# Patient Record
Sex: FEMALE | Race: BLACK OR AFRICAN AMERICAN
[De-identification: names, ages, dates, MRNs, and addresses within clinical notes are randomized per-mention and may not be internally consistent; named-entity substitution may affect disease eponyms.]

---

## 2018-10-31 ENCOUNTER — HOSPITAL ENCOUNTER (INPATIENT)
Dept: HOSPITAL 74 - FM/S | Age: 53
LOS: 4 days | Discharge: HOME | DRG: 470 | End: 2018-11-04
Attending: ORTHOPAEDIC SURGERY | Admitting: ORTHOPAEDIC SURGERY
Payer: COMMERCIAL

## 2018-10-31 VITALS — BODY MASS INDEX: 36.9 KG/M2

## 2018-10-31 DIAGNOSIS — D64.9: ICD-10-CM

## 2018-10-31 DIAGNOSIS — I10: ICD-10-CM

## 2018-10-31 DIAGNOSIS — E66.9: ICD-10-CM

## 2018-10-31 DIAGNOSIS — M17.11: Primary | ICD-10-CM

## 2018-10-31 PROCEDURE — 0SRC0J9 REPLACEMENT OF RIGHT KNEE JOINT WITH SYNTHETIC SUBSTITUTE, CEMENTED, OPEN APPROACH: ICD-10-PCS | Performed by: ORTHOPAEDIC SURGERY

## 2018-10-31 RX ADMIN — OXYCODONE HYDROCHLORIDE SCH MG: 10 TABLET, FILM COATED, EXTENDED RELEASE ORAL at 21:58

## 2018-10-31 RX ADMIN — OXYCODONE HYDROCHLORIDE AND ACETAMINOPHEN SCH MG: 500 TABLET ORAL at 21:59

## 2018-10-31 RX ADMIN — DOCUSATE SODIUM,SENNOSIDES SCH TABLET: 50; 8.6 TABLET, FILM COATED ORAL at 21:59

## 2018-10-31 NOTE — OP
DATE OF OPERATION:

 

DATE OF DICTATION:  10/31/2018

 

SURGEON:  Javon Canales M.D. 

 

ASSISTANT:  Martin Canales M.D., and DRAGAN Rodgers 

 

PREOPERATIVE DIAGNOSIS:  Tricompartment osteoarthritis right knee.  

 

POSTOPERATIVE DIAGNOSIS:  Tricompartment osteoarthritis right knee.  

 

OPERATION PERFORMED:  Right posterior stabilized total knee arthroplasty ([place])

subvastus approach.  

 

ANESTHESIA:  Spinal with peripheral block and conscious sedation.  

 

ANTIBIOTICS GIVEN:  2 g Kefzol, 1 g vancomycin preoperative, 1 g Kefzol given at the

time of release of tourniquet.

 

TOURNIQUET TIME:  100 minutes.

 

DESCRIPTION OF PROCEDURE:  Patient was correctly identified, brought in operating

room.  Right lower extremity was prepped, pre-draped in the routine manner with

Betadine scrub solution, wiped with alcohol, DuraPrep applied.  The right knee was

exposed via midline incision.  Dissection was taken through the fascia to expose the

entire quadriceps mechanism starting at the distal end of the tibial tubercle,

coursing up along the medial parapatellar region alongside the medial aspect of the

border of the patella ligament.  The dissection was curved towards the posterior

medial aspect of the femoral condyle towards the adductor canal and adductor hiatus. 

At that point, the fascia and epimysium were dissected sharply off the actual vastus

medialis muscle.  A plane created between the muscle and the capsule enabled sitting

of a blunt Hohmann into this area, and the tissue deep to that which was the

suprapatellar pouch and synovium of the pouch was extended longitudinally upwards to

give easy access to subluxing the patella laterally over the lateral femoral condyle

to gain exposure of the entire knee.  Tricompartment osteoarthritis encountered.  The

jig cuts were made with the [email or web address] system.  The patella was cut in accord with

Bergen line from patella ligament to quadriceps tendon.  The femur was cut with

the jig system appropriately all cuts made to measure for size 5 and a 4 degrees

varus, 3 degrees external rotation and size 5 femoral component was measured and cut

appropriately.  The tibia was cut to neutral.  The flexion, extension gaps were

measured at 16 mm.  The tibia was finally prepared appropriately with the appropriate

drill and winged punch.  The entry point of the intramedullary ed for the femur was

plugged with a bone plug. The tibia was seated, aligned to the tibial tubercle to

insure that correct orientation of foot was enjoyed.  Once we were satisfied with the

trialing of all components, both the mechanical axis was restored to that of exactly

in the mechanical axis.  The bone was thoroughly lavaged.  Cementing was done in 1

stage, femur size 5, tibia size 5, polyethylene liner after extra removal _____ all

cement that had been extruded and the tissue was thoroughly lavaged with a size 16

polyethylene with a TS posterior stabilized peg.  The patella was a size 27 mm

patella button, this was held in position until cement had cured as well.  Patella

tracking was excellent for the _____.  Knee was brought into full extension, complete

stability in both flexion and extension, indicating equal flexion extension gaps

appropriately.  The wounds were thoroughly lavaged again.  Closure, the parapatellar

tissues with 1 Vicryl, subcutaneous 1 and 2-0 Vicryl, skin 3-0 Monocryl with

Steri-Strips.  Drains, one 18-inch Hemovac to the medial side of the distal femur

where the distal aspect of the dissection was performed to expose the vastus

medialis.  No complications.  Excellent positioning clinically.  Range of movement 0

to 120 degrees with no difficulty.    

 

 

MD AYANA Becerra/[id number]

DD: 10/31/2018 19:08

DT: 10/31/2018 20:35

Job #:  [id number]

## 2018-10-31 NOTE — PN
Progress Note (short form)





- Note


Progress Note: 





Surgery





S/P  R TKA POD #0





- Pain control.


-DVT PPx:


   -Chemical: ASA 81 mg po BID x 6 weeks


   -Mechanical: ZOHRA's, SCD's


-Incentive Spirometry.


-PT/OT/Rehab, OOB.


-WBAT  RLE.


-f/u drain output


-f/u am labs.


-Care per medical hospitalist team.


-Discharge planning: f/u [facility] [place] office on


  Call for appointment: [phone]


-Will follow.





Javon Canales MD (Orthopaedic Surgery)

## 2018-10-31 NOTE — CONSULT
Consult


Consult Specialty:: Hospital Medicine


Referred by:: Dr. Canales


Reason for Consultation:: Medical Management





- History of Present Illness


Chief Complaint: R- Knee pain


History of Present Illness: 





This is a 52 y/o woman with a PMHx of HTN, Anemia, OA. Who was admitted to M/S 

floor after having elective surgery on her right knee. s/p R-TKR POD #0. 

Patient reports having chronic right knee pain that was so debilitating that 

she could not walk, prompting her to have surgery. Patient is AAOx3, reports 

having throbbing pain to her right knee. PS 8/10. Patient reports parasthesia 

to her right leg. Patient has eaten, and tolerated her meal. Patient has not 

voided, not having flatulence. Patient denies fever, chills, cough, SOB, CP, 

palpitations, N/V/D. dysuria.











- History Source


History Provided By: Patient


Limitations to Obtaining History: No Limitations





- Past Medical History


Cardio/Vascular: Yes: HTN


...LMP: 18


...Pregnant: No


Heme/Onc: Yes: Anemia


Musculoskeletal: Yes: Osteoarthritis





- Past Surgical History


Past Surgical History: Yes: Hysterectomy


Additional Surgical History: Myomectomy.  Hysteroscopy.  Nasal Sinus.   

Section





- Alcohol/Substance Use


Hx Alcohol Use: No


History of Substance Use: reports: None





- Smoking History


Smoking history: Never smoked


Have you smoked in the past 12 months: No





- Social History


Usual Living Arrangement: With Spouse


ADL: Independent


Occupation: Unemployed worked at the [employer] in the past


History of Recent Travel: No





Home Medications





- Allergies


Allergies/Adverse Reactions: 


 Allergies











Allergy/AdvReac Type Severity Reaction Status Date / Time


 


No Known Allergies Allergy   Verified 10/31/18 12:06














- Home Medications


Home Medications: 


Ambulatory Orders





Hydrochlorothiazide [Hctz -] 25 mg PO DAILY 18 


Aspirin/Acetaminophen/Caffeine [Excedrin Extra Strength Caplet] 1 each PO ASDIR 

PRN 10/24/18 











Family Disease History





- Family Disease History


Family Disease History: Heart Disease: Father (CHF), Mother (HTN, Stomach), CA: 

Mother





Review of Systems





- Review of Systems


Constitutional: reports: No Symptoms


Eyes: reports: No Symptoms


HENT: reports: No Symptoms


Neck: reports: No Symptoms


Cardiovascular: reports: No Symptoms


Respiratory: reports: No Symptoms


Gastrointestinal: reports: No Symptoms


Genitourinary: reports: No Symptoms


Breasts: reports: No Symptoms Reported


Musculoskeletal: reports: Joint Pain


Integumentary: reports: No Symptoms


Neurological: reports: Parasthesia


Endocrine: reports: No Symptoms


Hematology/Lymphatic: reports: No Symptoms


Psychiatric: reports: No Symptoms


Pain Intensity: 8





Physical Exam


Vital Signs: 


 Vital Signs











Temperature  97.6 F   10/31/18 22:00


 


Pulse Rate  103 H  10/31/18 22:00


 


Respiratory Rate  20   10/31/18 22:00


 


Blood Pressure  150/88   10/31/18 22:00


 


O2 Sat by Pulse Oximetry (%)  100   10/31/18 20:05











Constitutional: Yes: Mild Distress, Obese


Eyes: Yes: WNL, Conjunctiva Clear, EOM Intact, PERRL


HENT: Yes: WNL, Atraumatic, Normocephalic


Neck: Yes: WNL, Supple, Trachea Midline


Cardiovascular: Yes: WNL, Regular Rate and Rhythm, S1, S2


Respiratory: Yes: WNL, Regular, CTA Bilaterally, On Nasal O2


Gastrointestinal: Yes: Soft, Abdomen, Obese, Hypoactive Bowel Sounds


...Rectal Exam: Yes: Deferred


Breast(s): Yes: WNL


Musculoskeletal: Yes: Other (Right knee pain, icepack)


Edema: No


Wound/Incision: Yes: Dressing Dry and Intact


Neurological: Yes: WNL, Alert, Oriented, Cran Nerves II-XII Intact


...Motor Strength: LUE, LLE, RUE


Psychiatric: Yes: WNL, Alert, Oriented


Labs: 





 Laboratory Results - last 24 hr











  10/31/18





  12:00


 


Urine HCG, Qual  Negative








 Current Medications











Generic Name Dose Route Start Last Admin





  Trade Name Freq  PRN Reason Stop Dose Admin


 


Acetaminophen  650 mg  10/31/18 17:00  18 04:16





  Tylenol -  PO  18 16:59  650 mg





  Q6H OPHELIA   Administration





     





     





     





     


 


Al Hydroxide/Mg Hydroxide  30 ml  10/31/18 18:54  





  Mylanta Oral Suspension -  PO   





  Q4H PRN   





  DYSPEPSIA   





     





     





     


 


Ascorbic Acid  500 mg  10/31/18 22:00  10/31/18 21:59





  Vitamin C -  PO   500 mg





  BID OPHELIA   Administration





     





     





     





     


 


Aspirin  81 mg  18 10:00  





  Ecotrin -  PO   





  BID OPHELIA   





     





     





     





     


 


Ferrous Sulfate  325 mg  18 08:00  





  Feosol -  PO   





  BIDWM OPHELIA   





     





     





     





     


 


Hydrochlorothiazide  25 mg  18 10:00  





  Hctz -  PO   





  DAILY [facility]   





     





     





     





     


 


Hydromorphone HCl  1 mg  18 00:40  18 00:20





  Dilaudid Injection -  IVPB   1 mg





  Q4H PRN   Administration





  BREAKTHROUGH PAIN   





     





     





     


 


Cefazolin Sodium/Dextrose  2 gm in 50 mls @ 200 mls/hr  18 00:30   00:14





  Ancef 2 Gm Premixed Ivpb -  IVPB  18 08:44  200 mls/hr





  Q8H OPHELIA   Administration





     





     





     





     


 


Lactated Ringer's  1,000 mls @ 125 mls/hr  10/31/18 19:00  10/31/18 20:00





  Lactated Ringers Solution  IV  18 06:00  125 mls/hr





  ASDIR OPHELIA   Administration





     





     





     





     


 


Magnesium Hydroxide  30 ml  10/31/18 18:54  





  Milk Of Magnesia -  PO   





  PRN PRN   





  CONSTIPATION   





     





     





     


 


Multivitamins/Minerals/Vitamin C  1 tab  18 10:00  





  Tab-A-Vit -  PO   





  DAILY [facility]   





     





     





     





     


 


Ondansetron HCl  4 mg  10/31/18 18:54  





  Zofran Injection  IVPUSH   





  Q6H PRN   





  NAUSEA   





     





     





     


 


Oxycodone HCl  5 mg  10/31/18 16:51  





  Roxicodone -  PO   





  Q3H PRN   





  PAIN LEVEL 1-5   





     





     





     


 


Oxycodone HCl  10 mg  10/31/18 16:51  18 04:15





  Roxicodone -  PO   10 mg





  Q3H PRN   Administration





  PAIN LEVEL 6-10   





     





     





     


 


Oxycodone HCl  10 mg  10/31/18 22:00  10/31/18 21:58





  Oxycontin -  PO   10 mg





  BID [facility]   Administration





     





     





     





     


 


Pantoprazole Sodium  40 mg  18 10:00  





  Protonix -  PO   





  DAILY [facility]   





     





     





     





     


 


Senna/Docusate Sodium  2 tablet  10/31/18 22:00  10/31/18 21:59





  Pericolace -  PO   2 tablet





  BID [facility]   Administration





     





     





     





     








 Intake & Output











 10/29/18 10/30/18 10/31/18 11/01/18





 23:59 23:59 23:59 23:59


 


Intake Total   4500 


 


Output Total   20 


 


Balance   4480 


 


Weight   120.202 kg 














Problem List





- Problems


(1) Status post total right knee replacement


Assessment/Plan: 


continue ortho regimen


monitor for neurovascular changes


Continue pain meds


Incentive spirometry


Abduction pillow








Code(s): Z96.651 - PRESENCE OF RIGHT ARTIFICIAL KNEE JOINT   





(2) HTN (hypertension)


Assessment/Plan: 


Monitor BP


Monitor renal function


Continue HCTZ


Low Na diet





Code(s): I10 - ESSENTIAL (PRIMARY) HYPERTENSION   





(3) Anemia


Assessment/Plan: 


Monitor CBC


Will transfuse if Hgb < 7.0


Continue Ferrous Sulfate


Code(s): D64.9 - ANEMIA, UNSPECIFIED   





Assessment/Plan





This is a 52 y/o woman with a PMHx of HTN, OA, Anemia. s/p R-TKR





Plan:


Continue home meds


Repeat CBC and BMP in am


Abductor pillow


Ortho following


Incentive Spirometer


Continue home meds


FEN- PO fluids as tolerated, Replete lytes prn, Low Na Diet


DVT ppx- OOB SCDs, Continue Asa








Visit type





- Emergency Visit


Emergency Visit: No





- New Patient


This patient is new to me today: Yes


Date on this admission: 10/31/18





- Critical Care


Critical Care patient: No

## 2018-11-01 LAB
ANION GAP SERPL CALC-SCNC: 8 MMOL/L (ref 8–16)
BUN SERPL-MCNC: 16 MG/DL (ref 7–18)
CALCIUM SERPL-MCNC: 8.7 MG/DL (ref 8.4–10.2)
CHLORIDE SERPL-SCNC: 101 MMOL/L (ref 98–107)
CO2 SERPL-SCNC: 24 MMOL/L (ref 22–28)
CREAT SERPL-MCNC: 1 MG/DL (ref 0.6–1.3)
DEPRECATED RDW RBC AUTO: 18.9 % (ref 11.6–15.6)
GLUCOSE SERPL-MCNC: 124 MG/DL (ref 74–106)
HCT VFR BLD CALC: 33.5 % (ref 32.4–45.2)
HGB BLD-MCNC: 10.3 GM/DL (ref 10.7–15.3)
MCH RBC QN AUTO: 19.9 PG (ref 25.7–33.7)
MCHC RBC AUTO-ENTMCNC: 30.8 G/DL (ref 32–36)
MCV RBC: 64.7 FL (ref 80–96)
PLATELET # BLD AUTO: 350 K/MM3 (ref 134–434)
PMV BLD: 8.7 FL (ref 7.5–11.1)
POTASSIUM SERPLBLD-SCNC: 3.9 MMOL/L (ref 3.5–5.1)
RBC # BLD AUTO: 5.18 M/MM3 (ref 3.6–5.2)
SODIUM SERPL-SCNC: 133 MMOL/L (ref 136–145)
WBC # BLD AUTO: 14.6 K/MM3 (ref 4–10.8)

## 2018-11-01 RX ADMIN — CEFAZOLIN SODIUM SCH MLS/HR: 2 SOLUTION INTRAVENOUS at 08:08

## 2018-11-01 RX ADMIN — ACETAMINOPHEN SCH MG: 325 TABLET ORAL at 17:41

## 2018-11-01 RX ADMIN — ACETAMINOPHEN SCH MG: 325 TABLET ORAL at 00:20

## 2018-11-01 RX ADMIN — DOCUSATE SODIUM,SENNOSIDES SCH TABLET: 50; 8.6 TABLET, FILM COATED ORAL at 21:54

## 2018-11-01 RX ADMIN — ASPIRIN SCH MG: 81 TABLET, COATED ORAL at 21:53

## 2018-11-01 RX ADMIN — PANTOPRAZOLE SODIUM SCH MG: 40 TABLET, DELAYED RELEASE ORAL at 09:01

## 2018-11-01 RX ADMIN — ACETAMINOPHEN SCH MG: 325 TABLET ORAL at 04:16

## 2018-11-01 RX ADMIN — HYDROCHLOROTHIAZIDE SCH MG: 25 TABLET ORAL at 09:01

## 2018-11-01 RX ADMIN — FERROUS SULFATE TAB EC 324 MG (65 MG FE EQUIVALENT) SCH MG: 324 (65 FE) TABLET DELAYED RESPONSE at 08:08

## 2018-11-01 RX ADMIN — OXYCODONE HYDROCHLORIDE SCH MG: 10 TABLET, FILM COATED, EXTENDED RELEASE ORAL at 09:01

## 2018-11-01 RX ADMIN — ACETAMINOPHEN SCH MG: 325 TABLET ORAL at 11:50

## 2018-11-01 RX ADMIN — DOCUSATE SODIUM,SENNOSIDES SCH TABLET: 50; 8.6 TABLET, FILM COATED ORAL at 09:01

## 2018-11-01 RX ADMIN — OXYCODONE HYDROCHLORIDE SCH MG: 10 TABLET, FILM COATED, EXTENDED RELEASE ORAL at 22:52

## 2018-11-01 RX ADMIN — OXYCODONE HYDROCHLORIDE AND ACETAMINOPHEN SCH MG: 500 TABLET ORAL at 09:00

## 2018-11-01 RX ADMIN — ASPIRIN SCH MG: 81 TABLET, COATED ORAL at 09:00

## 2018-11-01 RX ADMIN — ACETAMINOPHEN SCH MG: 325 TABLET ORAL at 00:14

## 2018-11-01 RX ADMIN — OXYCODONE HYDROCHLORIDE AND ACETAMINOPHEN SCH MG: 500 TABLET ORAL at 21:54

## 2018-11-01 RX ADMIN — CEFAZOLIN SODIUM SCH MLS/HR: 2 SOLUTION INTRAVENOUS at 00:14

## 2018-11-01 RX ADMIN — MULTIVITAMIN TABLET SCH TAB: TABLET at 09:01

## 2018-11-01 RX ADMIN — FERROUS SULFATE TAB EC 324 MG (65 MG FE EQUIVALENT) SCH MG: 324 (65 FE) TABLET DELAYED RESPONSE at 17:41

## 2018-11-01 NOTE — PN
Progress Note (short form)





- Note


Progress Note: 





POD #1





Alert. Sitting in chair at bedside. C/o  incisional tenderness. Adequate pain 

control via meds as ordered. Hasn't ambulated yet. States her RLE still feels 

numb. Moving her toes...just doesn't feel comfortable/safe ambulating yet. 

Denies n/v/f/c, CP, palpitations or SOB.





 Last Vital Signs











Temp Pulse Resp BP Pulse Ox


 


 99.8 F H  94 H  19   145/65   99 


 


 11/01/18 06:00  11/01/18 06:00  11/01/18 06:00  11/01/18 06:00  11/01/18 07:09








Gen: alert. nad


RLE: Ice pack in place. Dressing c/d/i. Hemovac with 80mL (sanguinous). No calf 

tanderness. Foot warm.





Problem List





- Problems


(1) Status post total right knee replacement


Assessment/Plan: 


S/P  R TKA POD #1





- Pain control.


-DVT PPx:


   -Chemical: ASA 81 mg po BID x 6 weeks


   -Mechanical: ZOHRA's, SCD's


-Incentive Spirometry.


-PT/OT/Rehab, OOB.


-WBAT  RLE.


-f/u drain output


-f/u am labs.


-Care per medical hospitalist team.


-Discharge planning: f/u [facility] [place] office on


  Call for appointment: [phone]


-Will follow.


Code(s): Z96.651 - PRESENCE OF RIGHT ARTIFICIAL KNEE JOINT

## 2018-11-01 NOTE — PN
Physical Exam: 


SUBJECTIVE: Patient seen and examined, reports dull ache to right lower 

extremity.  





OBJECTIVE: patient is a 52 y/o, obese woman with a PMHx of HTN, Anemia, OA, 

patient is s/p right tkr, 10/31/18, Dr Canales spinal anesthesia. 





 Vital Signs











 Period  Temp  Pulse  Resp  BP Sys/Dent  Pulse Ox


 


 Last 24 Hr  97.6 F-99.8 F    12-20  141-155/65-98  











GENERAL: The patient is awake, alert, and fully oriented, in no acute distress.


HEAD: Normal with no signs of trauma.


EYES: PERRL, extraocular movements intact, sclera anicteric, conjunctiva clear. 

No ptosis. 


ENT: Ears normal, nares patent, oropharynx clear without exudates, moist mucous 


membranes.


NECK: Trachea midline, full range of motion, supple. 


LUNGS: Breath sounds equal, clear to auscultation bilaterally, no wheezes, no 

crackles, no 


accessory muscle use. 


HEART: Regular rate and rhythm, S1, S2 without murmur, rub or gallop.


ABDOMEN: Soft, nontender, nondistended, normoactive bowel sounds, no guarding, 

no 


rebound, no hepatosplenomegaly, no masses.


EXTREMITIES: 2+ pulses, warm, well-perfused, no edema. 


RIGHT LOWER EXTREMITY: dressing cdi, hemovac drain noted, serrous sang drainage 

80ml within the past 12 hours 


NEUROLOGICAL: Cranial nerves II through XII grossly intact. Normal speech, gait 

not 


observed.


PSYCH: Normal mood, normal affect.


SKIN: Warm, dry, normal turgor, no rashes or lesions noted














 Laboratory Results - last 24 hr











  10/31/18 11/01/18 11/01/18





  12:00 07:44 07:44


 


WBC   14.6 H 


 


RBC   5.18 


 


Hgb   10.3 L 


 


Hct   33.5 


 


MCV   64.7 L 


 


MCH   19.9 L 


 


MCHC   30.8 L 


 


RDW   18.9 H 


 


Plt Count   350 


 


MPV   8.7 


 


Sodium    133 L


 


Potassium    3.9


 


Chloride    101


 


Carbon Dioxide    24


 


Anion Gap    8


 


BUN    16


 


Creatinine    1.0


 


Creat Clearance w eGFR    57.78


 


Random Glucose    124 H


 


Calcium    8.7


 


Urine HCG, Qual  Negative  








Active Medications











Generic Name Dose Route Start Last Admin





  Trade Name Freq  PRN Reason Stop Dose Admin


 


Acetaminophen  650 mg  10/31/18 17:00  11/01/18 04:16





  Tylenol -  PO  11/03/18 16:59  650 mg





  Q6H OPHELIA   Administration





     





     





     





     


 


Al Hydroxide/Mg Hydroxide  30 ml  10/31/18 18:54  





  Mylanta Oral Suspension -  PO   





  Q4H PRN   





  DYSPEPSIA   





     





     





     


 


Ascorbic Acid  500 mg  10/31/18 22:00  10/31/18 21:59





  Vitamin C -  PO   500 mg





  BID [facility]   Administration





     





     





     





     


 


Aspirin  81 mg  11/01/18 10:00  





  Ecotrin -  PO   





  BID [facility]   





     





     





     





     


 


Ferrous Sulfate  325 mg  11/01/18 08:00  11/01/18 08:08





  Feosol -  PO   325 mg





  BIDWM [facility]   Administration





     





     





     





     


 


Hydrochlorothiazide  25 mg  11/01/18 10:00  





  Hctz -  PO   





  DAILY [facility]   





     





     





     





     


 


Hydromorphone HCl  1 mg  11/01/18 00:40  11/01/18 00:20





  Dilaudid Injection -  IVPB   1 mg





  Q4H PRN   Administration





  BREAKTHROUGH PAIN   





     





     





     


 


Cefazolin Sodium/Dextrose  2 gm in 50 mls @ 200 mls/hr  11/01/18 00:30  11/01/ 18 08:08





  Ancef 2 Gm Premixed Ivpb -  IVPB  11/01/18 08:44  200 mls/hr





  Q8H [facility]   Administration





     





     





     





     


 


Magnesium Hydroxide  30 ml  10/31/18 18:54  





  Milk Of Magnesia -  PO   





  PRN PRN   





  CONSTIPATION   





     





     





     


 


Multivitamins/Minerals/Vitamin C  1 tab  11/01/18 10:00  





  Tab-A-Vit -  PO   





  DAILY [facility]   





     





     





     





     


 


Ondansetron HCl  4 mg  10/31/18 18:54  





  Zofran Injection  IVPUSH   





  Q6H PRN   





  NAUSEA   





     





     





     


 


Oxycodone HCl  5 mg  10/31/18 16:51  





  Roxicodone -  PO   





  Q3H PRN   





  PAIN LEVEL 1-5   





     





     





     


 


Oxycodone HCl  10 mg  10/31/18 16:51  11/01/18 04:15





  Roxicodone -  PO   10 mg





  Q3H PRN   Administration





  PAIN LEVEL 6-10   





     





     





     


 


Oxycodone HCl  10 mg  10/31/18 22:00  10/31/18 21:58





  Oxycontin -  PO   10 mg





  BID [facility]   Administration





     





     





     





     


 


Pantoprazole Sodium  40 mg  11/01/18 10:00  





  Protonix -  PO   





  DAILY [facility]   





     





     





     





     


 


Senna/Docusate Sodium  2 tablet  10/31/18 22:00  10/31/18 21:59





  Pericolace -  PO   2 tablet





  BID [facility]   Administration





     





     





     





     











ASSESSMENT/PLAN:


1) MS


s/p right tkr


- prn pain medication


- physical therapy as per ortho


- monitor hemovac drainage, stict i/o 





2) cardiovascular


hypertension


- b/p at goal, continue hctz





2) heme


- repeat hgb 10, close following 





disp: will follow thank you for this consultative opportunity 





Visit type





- Emergency Visit


Emergency Visit: No





- New Patient


This patient is new to me today: Yes


Date on this admission: 11/01/18





- Critical Care


Critical Care patient: No





- Discharge Referral


Referred to [facility] No

## 2018-11-01 NOTE — PN
Progress Note (short form)





- Note


Progress Note: 





53F POD1 s/p R TKR under spinal anesthetic with peripheral nerve blocks for 

post operative pain relief.


Pt states that she has moderate amount of pain, does not report any anesthetic 

complications.


AVSS. Motor and sensory exam intact in bilateral lower extremities.


Continue current regimen.

## 2018-11-02 LAB
BACTERIA #/AREA URNS HPF: (no result) /HPF
DEPRECATED RDW RBC AUTO: 18.7 % (ref 11.6–15.6)
EPITH CASTS URNS QL MICRO: (no result) /HPF
HCT VFR BLD CALC: 30.9 % (ref 32.4–45.2)
HGB BLD-MCNC: 9.8 GM/DL (ref 10.7–15.3)
LEUKOCYTE ESTERASE UR QL STRIP.AUTO: (no result)
MCH RBC QN AUTO: 20.4 PG (ref 25.7–33.7)
MCHC RBC AUTO-ENTMCNC: 31.6 G/DL (ref 32–36)
MCV RBC: 64.5 FL (ref 80–96)
PH UR: 5.5 [PH] (ref 4.5–8)
PLATELET # BLD AUTO: 325 K/MM3 (ref 134–434)
PMV BLD: 8.6 FL (ref 7.5–11.1)
PROT UR QL STRIP: (no result)
RBC # BLD AUTO: (no result) /HPF (ref 0–3)
RBC # BLD AUTO: 4.79 M/MM3 (ref 3.6–5.2)
SP GR UR: 1.02 (ref 1.01–1.03)
T VAGINALIS URNS QL MICRO: (no result)
UROBILINOGEN UR STRIP-MCNC: 0.2 MG/DL (ref 0.2–1)
WBC # BLD AUTO: 14.7 K/MM3 (ref 4–10.8)
WBC # UR AUTO: (no result) /UL (ref 0–5)

## 2018-11-02 RX ADMIN — HYDROCHLOROTHIAZIDE SCH MG: 25 TABLET ORAL at 09:18

## 2018-11-02 RX ADMIN — ASPIRIN SCH MG: 81 TABLET, COATED ORAL at 09:17

## 2018-11-02 RX ADMIN — OXYCODONE HYDROCHLORIDE AND ACETAMINOPHEN SCH MG: 500 TABLET ORAL at 21:14

## 2018-11-02 RX ADMIN — DOCUSATE SODIUM,SENNOSIDES SCH TABLET: 50; 8.6 TABLET, FILM COATED ORAL at 21:14

## 2018-11-02 RX ADMIN — ACETAMINOPHEN SCH MG: 325 TABLET ORAL at 11:05

## 2018-11-02 RX ADMIN — FERROUS SULFATE TAB EC 324 MG (65 MG FE EQUIVALENT) SCH MG: 324 (65 FE) TABLET DELAYED RESPONSE at 08:15

## 2018-11-02 RX ADMIN — DOCUSATE SODIUM,SENNOSIDES SCH TABLET: 50; 8.6 TABLET, FILM COATED ORAL at 09:17

## 2018-11-02 RX ADMIN — OXYCODONE HYDROCHLORIDE AND ACETAMINOPHEN SCH MG: 500 TABLET ORAL at 09:17

## 2018-11-02 RX ADMIN — ACETAMINOPHEN SCH MG: 325 TABLET ORAL at 23:03

## 2018-11-02 RX ADMIN — PANTOPRAZOLE SODIUM SCH MG: 40 TABLET, DELAYED RELEASE ORAL at 09:17

## 2018-11-02 RX ADMIN — OXYCODONE HYDROCHLORIDE SCH MG: 10 TABLET, FILM COATED, EXTENDED RELEASE ORAL at 21:14

## 2018-11-02 RX ADMIN — ACETAMINOPHEN SCH MG: 325 TABLET ORAL at 17:44

## 2018-11-02 RX ADMIN — FERROUS SULFATE TAB EC 324 MG (65 MG FE EQUIVALENT) SCH MG: 324 (65 FE) TABLET DELAYED RESPONSE at 17:44

## 2018-11-02 RX ADMIN — ACETAMINOPHEN SCH: 325 TABLET ORAL at 01:15

## 2018-11-02 RX ADMIN — ASPIRIN SCH MG: 81 TABLET, COATED ORAL at 21:14

## 2018-11-02 RX ADMIN — MULTIVITAMIN TABLET SCH TAB: TABLET at 09:17

## 2018-11-02 RX ADMIN — OXYCODONE HYDROCHLORIDE SCH MG: 10 TABLET, FILM COATED, EXTENDED RELEASE ORAL at 09:17

## 2018-11-02 RX ADMIN — ACETAMINOPHEN SCH MG: 325 TABLET ORAL at 06:24

## 2018-11-02 NOTE — PN
Physical Exam: 


SUBJECTIVE: Patient seen and examined, patient is sitting in bedside recliner 

reports minimal pain to the right lower extremity.  





OBJECTIVE: patient is a 52 y/o, obese woman with a PMHx of HTN, Anemia, OA, 

patient is s/p right tkr, 10/31/18, Dr Canales spinal anesthesia. 





 Vital Signs











 Period  Temp  Pulse  Resp  BP Sys/Dent  Pulse Ox


 


 Last 24 Hr  98.1 F-99.7 F  103-122  18-18  119-143/60-70  93-95

















GENERAL: The patient is obese, awake, alert, and fully oriented, in no acute 

distress.


HEAD: Normal with no signs of trauma.


EYES: PERRL, extraocular movements intact, sclera anicteric, conjunctiva clear. 

No ptosis. 


ENT: Ears normal, nares patent, oropharynx clear without exudates, moist mucous 


membranes.


NECK: Trachea midline, full range of motion, supple. 


LUNGS: Breath sounds equal, clear to auscultation bilaterally, no wheezes, no 

crackles, no 


accessory muscle use. 


HEART: Regular rate and rhythm, S1, S2 without murmur, rub or gallop.


ABDOMEN: Soft, nontender, nondistended, normoactive bowel sounds, no guarding, 

no 


rebound, no hepatosplenomegaly, no masses.


EXTREMITIES: 2+ pulses, warm, well-perfused, no edema. 


RIGHT LOWER EXTREMITY: dressing cdi, hemovac drain noted, serrous sang drainage 

260ml within the past 24 hours 


NEUROLOGICAL: Cranial nerves II through XII grossly intact. Normal speech, gait 

not 


observed.


PSYCH: Normal mood, normal affect.


SKIN: Warm, dry, normal turgor, no rashes or lesions noted





 Laboratory Results - last 24 hr











  11/02/18





  07:36


 


WBC  14.7 H


 


RBC  4.79


 


Hgb  9.8 L


 


Hct  30.9 L


 


MCV  64.5 L


 


MCH  20.4 L


 


MCHC  31.6 L


 


RDW  18.7 H


 


Plt Count  325


 


MPV  8.6








Active Medications











Generic Name Dose Route Start Last Admin





  Trade Name Freq  PRN Reason Stop Dose Admin


 


Acetaminophen  650 mg  10/31/18 17:00  11/02/18 06:24





  Tylenol -  PO  11/03/18 16:59  650 mg





  Q6H OPHELIA   Administration





     





     





     





     


 


Al Hydroxide/Mg Hydroxide  30 ml  10/31/18 18:54  





  Mylanta Oral Suspension -  PO   





  Q4H PRN   





  DYSPEPSIA   





     





     





     


 


Ascorbic Acid  500 mg  10/31/18 22:00  11/02/18 09:17





  Vitamin C -  PO   500 mg





  BID OP[facility]   Administration





     





     





     





     


 


Aspirin  81 mg  11/01/18 10:00  11/02/18 09:17





  Ecotrin -  PO   81 mg





  BID [facility]   Administration





     





     





     





     


 


Ferrous Sulfate  325 mg  11/01/18 08:00  11/02/18 08:15





  Feosol -  PO   325 mg





  BIDWM OPHEL[facility]   Administration





     





     





     





     


 


Hydrochlorothiazide  25 mg  11/01/18 10:00  11/02/18 09:18





  Hctz -  PO   25 mg





  DAILY [facility]   Administration





     





     





     





     


 


Hydromorphone HCl  1 mg  11/01/18 00:40  11/01/18 00:20





  Dilaudid Injection -  IVPB   1 mg





  Q4H PRN   Administration





  BREAKTHROUGH PAIN   





     





     





     


 


Magnesium Hydroxide  30 ml  10/31/18 18:54  





  Milk Of Magnesia -  PO   





  PRN PRN   





  CONSTIPATION   





     





     





     


 


Multivitamins/Minerals/Vitamin C  1 tab  11/01/18 10:00  11/02/18 09:17





  Tab-A-Vit -  PO   1 tab





  DAILY [facility]   Administration





     





     





     





     


 


Ondansetron HCl  4 mg  10/31/18 18:54  





  Zofran Injection  IVPUSH   





  Q6H PRN   





  NAUSEA   





     





     





     


 


Oxycodone HCl  5 mg  10/31/18 16:51  





  Roxicodone -  PO   





  Q3H PRN   





  PAIN LEVEL 1-5   





     





     





     


 


Oxycodone HCl  10 mg  10/31/18 16:51  11/02/18 06:24





  Roxicodone -  PO   10 mg





  Q3H PRN   Administration





  PAIN LEVEL 6-10   





     





     





     


 


Oxycodone HCl  10 mg  10/31/18 22:00  11/02/18 09:17





  Oxycontin -  PO   10 mg





  BID OP[facility]   Administration





     





     





     





     


 


Pantoprazole Sodium  40 mg  11/01/18 10:00  11/02/18 09:17





  Protonix -  PO   40 mg





  DAILY [facility]   Administration





     





     





     





     


 


Senna/Docusate Sodium  2 tablet  10/31/18 22:00  11/02/18 09:17





  Pericolace -  PO   2 tablet





  BID [facility]   Administration





     





     





     





     











ASSESSMENT/PLAN:


1) MS


s/p right tkr


- prn pain medication


- physical therapy as per ortho


- a total of 260ml of serrous sangenous from hemovac within the past 24 hours, 

hemovac to remain in place until drainage is less than 30ml within 8 hours, 

discussed with Dr Javon Canales at bedside


- continue to monitor hemovac drainage, stict i/o 





2) cardiovascular


hypertension


- b/p at goal, continue hctz





2) heme


- repeat hgb 9.8, close following 





disp: will follow thank you for this consultative opportunity 





Visit type





- Emergency Visit


Emergency Visit: No





- New Patient


This patient is new to me today: No





- Critical Care


Critical Care patient: No





- Discharge Referral


Referred to [facility] No

## 2018-11-02 NOTE — PN
Progress Note (short form)





- Note


Progress Note: 





POD #2





Alert. Sitting in chair at bedside. C/o  incisional tenderness. Adequate pain 

control via meds as ordered. Ambulating with PT.


PT notes: Extension lacks 10-15 deg. Flex to ~85. Performed meghan, heel slides, 

SLR, TKE, LAQ, heel/toe raises, ham curls and velma well. Ice applied. Denies n/v/

f/c, CP, palpitations or SOB.





 Last Vital Signs











Temp Pulse Resp BP Pulse Ox


 


 99.7 F H  103 H  18   123/62   93 L


 


 11/02/18 06:00  11/02/18 02:00  11/02/18 06:00  11/02/18 06:00  11/02/18 06:00








 CBC, BMP





 11/01/18 07:44 





 11/01/18 07:44 





Gen: alert. nad


RLE: Ice pack in place. Dressing c/d/i. Hemovac 260mL/24hrs (sanguinous). No 

calf tanderness. Foot warm.





Problem List





- Problems


(1) Status post total right knee replacement


Assessment/Plan: 





S/P  R TKA POD #2





- Pain control.


-DVT PPx:


   -Chemical: ASA 81 mg po BID x 6 weeks


   -Mechanical: ZOHRA's, SCD's


-Incentive Spirometry.


-PT/OT/Rehab, OOB.


-WBAT  RLE.


-f/u drain output


-f/u am labs.


-Care per medical hospitalist team.


-Discharge planning: f/u [facility] [place] office on


  Call for appointment: [phone]


-Will follow.


Code(s): Z96.651 - PRESENCE OF RIGHT ARTIFICIAL KNEE JOINT 





Problem List





- Problems


(1) Status post total right knee replacement


Code(s): Z96.651 - PRESENCE OF RIGHT ARTIFICIAL KNEE JOINT

## 2018-11-03 RX ADMIN — ASPIRIN SCH MG: 81 TABLET, COATED ORAL at 22:07

## 2018-11-03 RX ADMIN — FERROUS SULFATE TAB EC 324 MG (65 MG FE EQUIVALENT) SCH MG: 324 (65 FE) TABLET DELAYED RESPONSE at 16:41

## 2018-11-03 RX ADMIN — DOCUSATE SODIUM,SENNOSIDES SCH TABLET: 50; 8.6 TABLET, FILM COATED ORAL at 10:46

## 2018-11-03 RX ADMIN — FERROUS SULFATE TAB EC 324 MG (65 MG FE EQUIVALENT) SCH MG: 324 (65 FE) TABLET DELAYED RESPONSE at 08:41

## 2018-11-03 RX ADMIN — DOCUSATE SODIUM,SENNOSIDES SCH: 50; 8.6 TABLET, FILM COATED ORAL at 22:07

## 2018-11-03 RX ADMIN — KETOROLAC TROMETHAMINE SCH MG: 10 TABLET, FILM COATED ORAL at 23:22

## 2018-11-03 RX ADMIN — OXYCODONE HYDROCHLORIDE AND ACETAMINOPHEN SCH MG: 500 TABLET ORAL at 10:47

## 2018-11-03 RX ADMIN — MULTIVITAMIN TABLET SCH TAB: TABLET at 10:46

## 2018-11-03 RX ADMIN — ACETAMINOPHEN SCH MG: 325 TABLET ORAL at 05:20

## 2018-11-03 RX ADMIN — HYDROCHLOROTHIAZIDE SCH MG: 25 TABLET ORAL at 10:47

## 2018-11-03 RX ADMIN — PANTOPRAZOLE SODIUM SCH MG: 40 TABLET, DELAYED RELEASE ORAL at 10:47

## 2018-11-03 RX ADMIN — OXYCODONE HYDROCHLORIDE SCH MG: 10 TABLET, FILM COATED, EXTENDED RELEASE ORAL at 22:06

## 2018-11-03 RX ADMIN — OXYCODONE HYDROCHLORIDE SCH MG: 10 TABLET, FILM COATED, EXTENDED RELEASE ORAL at 10:48

## 2018-11-03 RX ADMIN — OXYCODONE HYDROCHLORIDE AND ACETAMINOPHEN SCH MG: 500 TABLET ORAL at 22:07

## 2018-11-03 RX ADMIN — ACETAMINOPHEN SCH MG: 325 TABLET ORAL at 10:47

## 2018-11-03 RX ADMIN — ACETAMINOPHEN PRN MG: 325 TABLET ORAL at 22:06

## 2018-11-03 RX ADMIN — ASPIRIN SCH MG: 81 TABLET, COATED ORAL at 10:47

## 2018-11-03 NOTE — PN
Physical Exam: 


SUBJECTIVE: Patient seen and examined








OBJECTIVE:





 Vital Signs











 Period  Temp  Pulse  Resp  BP Sys/Dent  Pulse Ox


 


 Last 24 Hr  98.9 F-99.8 F  106-114  17-19  115-142/59-71  93-95











GENERAL: The patient is awake, alert, and fully oriented, in no acute distress.


HEAD: Normal with no signs of trauma.


EYES: PERRL, extraocular movements intact, sclera anicteric, conjunctiva clear. 

No ptosis. 


ENT: Ears normal, nares patent, oropharynx clear without exudates, moist mucous 


membranes.


NECK: Trachea midline, full range of motion, supple. 


LUNGS: Breath sounds equal, clear to auscultation bilaterally, no wheezes, no 

crackles, no 


accessory muscle use. 


HEART: Regular rate and rhythm, S1, S2 without murmur, rub or gallop.


ABDOMEN: Soft, nontender, nondistended, normoactive bowel sounds, no guarding, 

no 


rebound, no hepatosplenomegaly, no masses.


EXTREMITIES: 2+ pulses, warm, well-perfused, no edema. 


NEUROLOGICAL: Cranial nerves II through XII grossly intact. Normal speech, gait 

not 


observed.


PSYCH: Normal mood, normal affect.


SKIN: Warm, dry, normal turgor, no rashes or lesions noted














 Laboratory Results - last 24 hr











  11/02/18





  17:00


 


Urine RBC  0-2


 


Urine WBC  20-30


 


Ur Epithelial Cells  Few


 


Urine Bacteria  4+


 


Urine Trichomonas  1+








Active Medications











Generic Name Dose Route Start Last Admin





  Trade Name Freq  PRN Reason Stop Dose Admin


 


Al Hydroxide/Mg Hydroxide  30 ml  10/31/18 18:54  





  Mylanta Oral Suspension -  PO   





  Q4H PRN   





  DYSPEPSIA   





     





     





     


 


Ascorbic Acid  500 mg  10/31/18 22:00  11/03/18 10:47





  Vitamin C -  PO   500 mg





  BID OPHELIA   Administration





     





     





     





     


 


Aspirin  81 mg  11/01/18 10:00  11/03/18 10:47





  Ecotrin -  PO   81 mg





  BID OPHELIA   Administration





     





     





     





     


 


Ferrous Sulfate  325 mg  11/01/18 08:00  11/03/18 16:41





  Feosol -  PO   325 mg





  BIDWM OPHELIA   Administration





     





     





     





     


 


Hydrochlorothiazide  25 mg  11/01/18 10:00  11/03/18 10:47





  Hctz -  PO   25 mg





  DAILY OPHELIA   Administration





     





     





     





     


 


Hydromorphone HCl  1 mg  11/01/18 00:40  11/01/18 00:20





  Dilaudid Injection -  IVPB   1 mg





  Q4H PRN   Administration





  BREAKTHROUGH PAIN   





     





     





     


 


Magnesium Hydroxide  30 ml  10/31/18 18:54  





  Milk Of Magnesia -  PO   





  PRN PRN   





  CONSTIPATION   





     





     





     


 


Multivitamins/Minerals/Vitamin C  1 tab  11/01/18 10:00  11/03/18 10:46





  Tab-A-Vit -  PO   1 tab





  DAILY OPHELIA   Administration





     





     





     





     


 


Ondansetron HCl  4 mg  10/31/18 18:54  





  Zofran Injection  IVPUSH   





  Q6H PRN   





  NAUSEA   





     





     





     


 


Oxycodone HCl  10 mg  10/31/18 22:00  11/03/18 10:48





  Oxycontin -  PO   10 mg





  BID OPHELIA   Administration





     





     





     





     


 


Pantoprazole Sodium  40 mg  11/01/18 10:00  11/03/18 10:47





  Protonix -  PO   40 mg





  DAILY OPHELIA   Administration





     





     





     





     


 


Senna/Docusate Sodium  2 tablet  10/31/18 22:00  11/03/18 10:46





  Pericolace -  PO   2 tablet





  BID OPHELIA   Administration





     





     





     





     











ASSESSMENT/PLAN:


1) MS


s/p right tkr


- prn pain medication


- physical therapy as per ortho


- a total of 260ml of serrous sangenous from hemovac within the past 24 hours, 

hemovac to remain in place until drainage is less than 30ml within 8 hours, 

discussed with Dr Javon Canales at bedside


- continue to monitor hemovac drainage, stict i/o 





2) cardiovascular


hypertension


- b/p at goal, continue hctz





2) heme


- repeat hgb 9.8, close following

## 2018-11-03 NOTE — PN
Progress Note (short form)





- Note


Progress Note: 





POD#3


Feeling well   No C/O pain


Neurovascular status   Normal


         no calf or subsartorian tenderness


Bandage dry


Drain removed





PLAN   D/C home tomorrow


      Pain mx


      Mobilze FWBAT   Home PT


      Leave dressing until seen in the office next week Thurs/Estella

## 2018-11-04 VITALS — SYSTOLIC BLOOD PRESSURE: 138 MMHG | HEART RATE: 107 BPM | TEMPERATURE: 98.8 F | DIASTOLIC BLOOD PRESSURE: 84 MMHG

## 2018-11-04 RX ADMIN — OXYCODONE HYDROCHLORIDE AND ACETAMINOPHEN SCH MG: 500 TABLET ORAL at 09:28

## 2018-11-04 RX ADMIN — PANTOPRAZOLE SODIUM SCH MG: 40 TABLET, DELAYED RELEASE ORAL at 09:27

## 2018-11-04 RX ADMIN — OXYCODONE HYDROCHLORIDE SCH MG: 10 TABLET, FILM COATED, EXTENDED RELEASE ORAL at 09:27

## 2018-11-04 RX ADMIN — MULTIVITAMIN TABLET SCH TAB: TABLET at 09:28

## 2018-11-04 RX ADMIN — HYDROCHLOROTHIAZIDE SCH MG: 25 TABLET ORAL at 09:27

## 2018-11-04 RX ADMIN — ASPIRIN SCH MG: 81 TABLET, COATED ORAL at 09:26

## 2018-11-04 RX ADMIN — DOCUSATE SODIUM,SENNOSIDES SCH TABLET: 50; 8.6 TABLET, FILM COATED ORAL at 09:27

## 2018-11-04 RX ADMIN — FERROUS SULFATE TAB EC 324 MG (65 MG FE EQUIVALENT) SCH MG: 324 (65 FE) TABLET DELAYED RESPONSE at 08:10

## 2018-11-04 RX ADMIN — ACETAMINOPHEN PRN MG: 325 TABLET ORAL at 03:30

## 2018-11-04 RX ADMIN — KETOROLAC TROMETHAMINE SCH: 10 TABLET, FILM COATED ORAL at 06:16

## 2018-11-04 NOTE — DS
Physical Exam: 


SUBJECTIVE: Patient seen and examined








OBJECTIVE:





 Vital Signs











 Period  Temp  Pulse  Resp  BP Sys/Dent  Pulse Ox


 


 Last 24 Hr  98.4 F-99.0 F  101-108  17-20  115-143/59-84  93-97








PHYSICAL EXAM





GENERAL: The patient is awake, alert, and fully oriented, in no acute distress.


HEAD: Normal with no signs of trauma.


EYES: PERRL, extraocular movements intact, sclera anicteric, conjunctiva clear. 


ENT: Ears normal, nares patent, oropharynx clear without exudates, moist mucous 

membranes.


NECK: Trachea midline, full range of motion, supple. 


LUNGS: Breath sounds equal, clear to auscultation bilaterally, no wheezes, no 

crackles, no accessory muscle use. 


HEART: Regular rate and rhythm, S1, S2 without murmur, rub or gallop.


ABDOMEN: Soft, nontender, nondistended, normoactive bowel sounds, no guarding, 

no rebound, no hepatosplenomegaly, no masses.


EXTREMITIES: 2+ pulses, warm, well-perfused, no edema. 


NEUROLOGICAL: Cranial nerves II through XII grossly intact. Normal speech, gait 

not observed.


PSYCH: Normal mood, normal affect.


SKIN: Warm, dry, normal turgor, no rashes or lesions noted.





LABS





HOSPITAL COURSE:





Date of Admission:10/31/18





Date of Discharge: 11/04/18











Minutes to complete discharge: 35





Discharge Summary


Reason For Visit: UNILATERAL PRIMARY OA RT KNEE


Current Active Problems





Anemia (Acute)


HTN (hypertension) (Acute)


Status post total right knee replacement (Acute)








Condition: Improved





- Instructions


Diet, Activity, Other Instructions: 


Dr. Canales Discharge Instructions for Knee Replacement





Post Operative Instructions





Physical activity


Physical Therapist will come to your home for the first 5 days. You will be set 

up with outpatient PT at your first post-operative visit. Use assistive devices 

for ambulation at all times. Weight bearing as tolerated on your surgical side. 

Do not put pillow under knee. May put pillow under heel.





Wound care


Leave your surgical dressing in place. Do not change the dressing until seen by 

your surgeon in the office. No baths or showers. Do not submerge your incision. 

Do not apply any ointments or lotions to your incision. Please call the office 

if your dressing is soiled/dirty or is falling off. Apply Graduated Compression 

Stockings (TEDS) to both lower extremities - remove daily for hygiene ONLY. 





Diet


There are no dietary restrictions. Eat healthy, high-fiber foods. Drink 6 to 8 

glasses of liquid each day. This will assist in keeping your bowels are regular.








Pain management


Any pain prescription medication ordered should be taken as prescribed for 

moderate to severe pain. Do not take additional Tylenol while taking Percocet.





Take Aspirin 81 mg two times a day for a total of 6 weeks to prevent blood 

clots.





Call Dr. Canales for any of the following:


Severe pain not relieved by medication


Fever of 101 or higher


Excessive bleeding or drainage on dressing


Inability to urinate


If you experience chest pain or shortness of breath, please seek emergency care 

immediately.





Please call the office at [phone] to confirm your post-op appointment 

for the week following surgery.








This report was requested by: Brit Marshall | Reference #: [id number]


Referrals: 


Javon Canales MD [Staff Physician] - 


Disposition: HOME





- Home Medications


Comprehensive Discharge Medication List: 


Ambulatory Orders





Hydrochlorothiazide [Hctz -] 25 mg PO DAILY 05/14/18 


Oxycodone HCl 5 mg PO Q6H #28 tablet MDD 4 11/04/18 








This patient is new to me today: No


Emergency Visit: No


Critical Care patient: No





- Discharge Referral


Referred to [facility] No

## 2018-11-06 NOTE — PATH
Surgical Pathology Report



Patient Name:  JANETTE GUNN

Accession #:  [id number]

Med. Rec. #:  [id number]                                                        

   /Age/Gender:  1965 (Age: 53) / F

Account:  [id number]                                                          

             Location: [facility]

Taken:  10/31/2018

Received:  10/31/2018

Reported:  2018

Physicians:  Javon Canales M.D.

  



Specimen(s) Received

 RIGHT KNEE BONES 





Clinical History

Unilateral primary osteoarthritis right knee







Final Diagnosis

KNEE BONES, RIGHT, TOTAL KNEE REPLACEMENT:

DEGENERATIVE JOINT DISEASE.





***Electronically Signed***

Rosa Decker M.D.





Gross Description

Received in formalin, labeled "right knee bones" are multiple portions of

cartilage-capped bone and fibrofatty tissue having an aggregate are 15.5 x 11 x

2 cm. The articular surfaces appear granular and show areas of eburnation.

Representative sections are submitted in one cassette after decalcification.

AE/2018



ebram/[email or web address]

## 2019-01-09 ENCOUNTER — HOSPITAL ENCOUNTER (INPATIENT)
Dept: HOSPITAL 74 - FM/S | Age: 54
LOS: 3 days | Discharge: HOME HEALTH | DRG: 470 | End: 2019-01-12
Attending: ORTHOPAEDIC SURGERY | Admitting: ORTHOPAEDIC SURGERY
Payer: COMMERCIAL

## 2019-01-09 VITALS — BODY MASS INDEX: 39.4 KG/M2

## 2019-01-09 DIAGNOSIS — R00.0: ICD-10-CM

## 2019-01-09 DIAGNOSIS — M17.12: Primary | ICD-10-CM

## 2019-01-09 DIAGNOSIS — K21.9: ICD-10-CM

## 2019-01-09 DIAGNOSIS — D64.9: ICD-10-CM

## 2019-01-09 DIAGNOSIS — I10: ICD-10-CM

## 2019-01-09 DIAGNOSIS — I95.9: ICD-10-CM

## 2019-01-09 PROCEDURE — 0SRD0J9 REPLACEMENT OF LEFT KNEE JOINT WITH SYNTHETIC SUBSTITUTE, CEMENTED, OPEN APPROACH: ICD-10-PCS | Performed by: ORTHOPAEDIC SURGERY

## 2019-01-09 RX ADMIN — DOCUSATE SODIUM,SENNOSIDES SCH TABLET: 50; 8.6 TABLET, FILM COATED ORAL at 21:24

## 2019-01-09 RX ADMIN — CEFAZOLIN SODIUM SCH MLS/HR: 2 SOLUTION INTRAVENOUS at 17:10

## 2019-01-09 RX ADMIN — ASPIRIN SCH MG: 81 TABLET, COATED ORAL at 21:24

## 2019-01-09 RX ADMIN — OXYCODONE HYDROCHLORIDE SCH MG: 10 TABLET, FILM COATED, EXTENDED RELEASE ORAL at 21:23

## 2019-01-09 RX ADMIN — CEFAZOLIN ONE: 1 INJECTION, POWDER, FOR SOLUTION INTRAVENOUS at 19:26

## 2019-01-09 RX ADMIN — CEFAZOLIN SODIUM SCH: 2 SOLUTION INTRAVENOUS at 19:25

## 2019-01-09 NOTE — CONSULT
Consultation: 


REQUESTING PROVIDER:





CONSULT REQUEST: We have been asked to medically evaluate this patient for (

specify).





HISTORY OF PRESENT ILLNESS:











REVIEW OF SYSTEMS:


CONSTITUTIONAL: 


Absent:  fever, chills, diaphoresis, generalized weakness, malaise, loss of 

appetite, weight change


HEENT: 


Absent:  rhinorrhea, nasal congestion, throat pain, throat swelling, difficulty 

swallowing, mouth swelling, ear pain, eye pain, visual changes


CARDIOVASCULAR: 


Absent: chest pain, syncope, palpitations, irregular heart rate, lightheadedness

, peripheral edema


RESPIRATORY: 


Absent: cough, shortness of breath, dyspnea with exertion, orthopnea, wheezing, 

stridor, hemoptysis


GASTROINTESTINAL:


Absent: abdominal pain, abdominal distension, nausea, vomiting, diarrhea, 

constipation, melena, hematochezia


GENITOURINARY: 


Absent: dysuria, frequency, urgency, hesitancy, hematuria, flank pain, genital 

pain


MUSCULOSKELETAL: 


Absent: myalgia, arthralgia, joint swelling, back pain, neck pain


SKIN: 


Absent: rash, itching, pallor


HEMATOLOGIC/IMMUNOLOGIC: 


Absent: easy bleeding, easy bruising, lymphadenopathy, frequent infections


ENDOCRINE:


Absent: unexplained weight gain, unexplained weight loss, heat intolerance, 

cold intolerance


NEUROLOGIC: 


Absent: headache, focal weakness or paresthesias, dizziness, unsteady gait, 

seizure, mental status changes, bladder or bowel incontinence


PSYCHIATRIC: 


Absent: anxiety, depression, suicidal or homicidal ideation, hallucinations.





PHYSICAL EXAMINATION





 Vital Signs - 24 hr











  01/09/19 01/09/19 01/09/19





  06:55 12:00 12:05


 


Temperature 98.9 F 98.1 F 98.1 F


 


Pulse Rate 96 H 81 79


 


Respiratory 18 19 19





Rate   


 


Blood Pressure 144/92 125/70 129/76


 


O2 Sat by Pulse  98 98





Oximetry (%)   














  01/09/19 01/09/19 01/09/19





  12:10 12:15 12:30


 


Temperature 98.1 F 98.1 F 98.1 F


 


Pulse Rate 80 81 79


 


Respiratory 19 17 17





Rate   


 


Blood Pressure 127/78 129/74 126/71


 


O2 Sat by Pulse 98 98 98





Oximetry (%)   














  01/09/19 01/09/19





  12:45 13:00


 


Temperature 98.1 F 98.1 F


 


Pulse Rate 78 81


 


Respiratory 17 17





Rate  


 


Blood Pressure 123/63 111/69


 


O2 Sat by Pulse 98 97





Oximetry (%)  














GENERAL: Awake, alert, and fully oriented, in no acute distress.


HEAD: Normal with no signs of trauma.


EYES: Pupils equal, round and reactive to light, extraocular movements intact, 

sclera anicteric, conjunctiva clear. No lid lag.


EARS, NOSE, THROAT: Ears normal, nares patent, oropharynx clear without 

exudates. Moist mucous membranes.


NECK: Normal range of motion, supple without lymphadenopathy, JVD, or masses.


LUNGS: Breath sounds equal, clear to auscultation bilaterally. No wheezes, and 

no crackles. No accessory muscle use.


HEART: Regular rate and rhythm, normal S1 and S2 without murmur, rub or gallop.


ABDOMEN: Soft, nontender, not distended, normoactive bowel sounds, no guarding, 

no rebound, no masses.  No hepatomegaly or splenomegaly. 


MUSCULOSKELETAL: Normal range of motion at all joints. No bony deformities or 

tenderness. No CVA tenderness.


UPPER EXTREMITIES: 2+ pulses, warm, well-perfused. No cyanosis. No clubbing. 

Cap refill <2 seconds. No peripheral edema.


LOWER EXTREMITIES: 2+ pulses, warm, well-perfused. No calf tenderness. No 

peripheral edema. 


NEUROLOGICAL:  Cranial nerves II-XII intact. Normal speech. Normal gait.


PSYCHIATRIC: Cooperative. Good eye contact. Appropriate mood and affect.


SKIN: Warm, dry, normal turgor, no rashes or lesions noted. 











 Laboratory Results - last 24 hr











  01/09/19





  06:30


 


Urine HCG, Qual  Negative








Active Medications











Generic Name Dose Route Start Last Admin





  Trade Name Freq  PRN Reason Stop Dose Admin


 


Al Hydroxide/Mg Hydroxide  30 ml  01/09/19 11:15  





  Mylanta Oral Suspension -  PO   





  Q4H PRN   





  DYSPEPSIA   





     





     





     


 


Aspirin  325 mg  01/09/19 22:00  





  Asa -  PO   





  BID OPHELIA   





     





     





     





     


 


Hydrochlorothiazide  25 mg  01/10/19 10:00  





  Hctz -  PO   





  DAILY OPHELIA   





     





     





     





     


 


Cefazolin Sodium 2 gm/  50 mls @ 200 mls/hr  01/09/19 11:30  





  Dextrose  IVPB  01/09/19 18:14  





  Q8H-IV OPHELIA   





     





     





     





     


 


Lactated Ringer's  1,000 mls @ 125 mls/hr  01/09/19 11:15  





  Lactated Ringers Solution  IV  01/10/19 06:00  





  ASDIR OPHELIA   





     





     





     





     


 


Magnesium Hydroxide  30 ml  01/09/19 11:15  





  Milk Of Magnesia -  PO   





  PRN PRN   





  CONSTIPATION   





     





     





     


 


Multivitamins/Minerals/Vitamin C  1 tab  01/10/19 10:00  





  Tab-A-Vit -  PO   





  DAILY [facility]   





     





     





     





     


 


Ondansetron HCl  4 mg  01/09/19 11:15  





  Zofran Injection  IVPUSH   





  Q6H PRN   





  NAUSEA   





     





     





     


 


Pantoprazole Sodium  40 mg  01/10/19 10:00  





  Protonix -  PO   





  DAILY [facility]   





     





     





     





     


 


Senna/Docusate Sodium  2 tablet  01/09/19 22:00  





  Pericolace -  PO   





  BID [facility]   





     





     





     





     











ASSESSMENT/PLAN:





Dispo: We will continue to follow the patient. Thank you for this consultative 

opportunity.

## 2019-01-09 NOTE — HP
History & Physical Update





- History


History: No Change





- Physical


Physical: No Change





- Assessment


Assessment: No Change





- Plan


Plan: No Change (Full H&P in chart from 12/20/18)

## 2019-01-09 NOTE — CONSULT
Consult


Consult Specialty:: Hospitalist Medicine


Referred by:: Dr. Canales


Reason for Consultation:: Medical Management





- History of Present Illness


Chief Complaint: L- Knee OA


History of Present Illness: 


This is a 52 y/o woman with a PMHx of HTN, Anemia, OA. Who was admitted to [facility]/S 

after having elective surgery on her left knee. s/p L- TKA POD #0. Patient 

reports due to the chronic pain in her left knee she was having difficulty 

getting OOB and ambulating; she decided to have the surgery. Patient is AAOx3, 

she reports having pain to her left knee PS 5/10. Patient reports having full 

sensation and voiding. Patient denies N/V, no flatulence, no BM. Patient denies 

fever, chills, cough, SOB, CP, palpitations, dysuria.








- History Source


History Provided By: Patient


Limitations to Obtaining History: No Limitations





- Past Medical History


Cardio/Vascular: Yes: HTN


Gastrointestinal: Yes: GERD


...LMP: 07/23/18


Heme/Onc: Yes: Anemia


Musculoskeletal: Yes: Osteoarthritis





- Past Surgical History


Past Surgical History: Yes: Arthrosocopy (Right Knee), Hysterectomy (NICOLE)


Additional Surgical History: Myomectomy.  Nasal





- Alcohol/Substance Use


Hx Alcohol Use: No


History of Substance Use: reports: None





- Smoking History


Smoking history: Never smoked


Have you smoked in the past 12 months: No





- Social History


Usual Living Arrangement: With Child


ADL: Independent


Occupation: Retired- worked at the [employer] 


History of Recent Travel: No





Home Medications





- Allergies


Allergies/Adverse Reactions: 


 Allergies











Allergy/AdvReac Type Severity Reaction Status Date / Time


 


No Known Allergies Allergy   Verified 12/27/18 14:46














- Home Medications


Home Medications: 


Ambulatory Orders





Hydrochlorothiazide [Hctz -] 25 mg PO DAILY 05/14/18 


Oxycodone HCl 5 mg PO Q6H #28 tablet MDD 4 11/04/18 











Family Disease History





- Family Disease History


Family Disease History: Heart Disease: Father (CHF), Mother (HTN, Stomach), CA: 

Mother





Review of Systems





- Review of Systems


Constitutional: reports: No Symptoms


Eyes: reports: No Symptoms


HENT: reports: No Symptoms


Neck: reports: No Symptoms


Cardiovascular: reports: No Symptoms


Respiratory: reports: No Symptoms


Gastrointestinal: reports: No Symptoms


Genitourinary: reports: No Symptoms


Breasts: reports: No Symptoms Reported


Musculoskeletal: reports: Joint Pain (left knee)


Integumentary: reports: No Symptoms


Neurological: reports: No Symptoms


Endocrine: reports: No Symptoms


Hematology/Lymphatic: reports: No Symptoms


Psychiatric: reports: No Symptoms





Physical Exam


Vital Signs: 


 Vital Signs











Temperature  98.2 F   01/09/19 18:44


 


Pulse Rate  108 H  01/09/19 18:44


 


Respiratory Rate  18   01/09/19 19:42


 


Blood Pressure  130/74   01/09/19 18:44


 


O2 Sat by Pulse Oximetry (%)  97   01/09/19 19:42











Constitutional: Yes: Well Nourished, No Distress, Calm


Eyes: Yes: WNL, Conjunctiva Clear, EOM Intact, PERRL


HENT: Yes: WNL, Atraumatic, Normocephalic


Neck: Yes: WNL, Supple, Trachea Midline


Cardiovascular: Yes: WNL, Regular Rate and Rhythm, S1, S2


Respiratory: Yes: WNL, Regular, CTA Bilaterally


Gastrointestinal: Yes: WNL, Normal Bowel Sounds, Soft, Abdomen, Obese


Renal/: Yes: WNL


Breast(s): Yes: WNL


Musculoskeletal: Yes: Other (L- knee: Bandage C/D/I Icepack Hemovac with bloody 

drainage TN to palpation, with swelling)


Extremities: Yes: WNL


Peripheral Pulses WNL: Yes


Neurological: Yes: WNL, Alert, Oriented, Cran Nerves II-XII Intact


...Motor Strength: WNL


Psychiatric: Yes: WNL, Alert, Oriented


Labs: 





 Laboratory Results - last 24 hr











  01/09/19





  06:30


 


Urine HCG, Qual  Negative








 Intake & Output











 01/07/19 01/08/19 01/09/19 01/10/19





 23:59 23:59 23:59 23:59


 


Intake Total   1625 


 


Output Total   455 


 


Balance   1170 


 


Weight   128.367 kg 








 Current Medications











Generic Name Dose Route Start Last Admin





  Trade Name Freq  PRN Reason Stop Dose Admin


 


Al Hydroxide/Mg Hydroxide  30 ml  01/09/19 11:15  





  Mylanta Oral Suspension -  PO   





  Q4H PRN   





  DYSPEPSIA   





     





     





     


 


Aspirin  81 mg  01/09/19 22:00  01/09/19 21:24





  Ecotrin -  PO   81 mg





  BID OPHELIA   Administration





     





     





     





     


 


Fentanyl  50 mcg  01/09/19 13:56  





  Sublimaze Injection -  IVPUSH   





  [id number] PRN   





  PAIN-PACU ORDER X 4 DOSES ONLY   





     





     





     


 


Hydrochlorothiazide  25 mg  01/10/19 10:00  





  Hctz -  PO   





  DAILY [facility]   





     





     





     





     


 


Lactated Ringer's  1,000 mls @ 125 mls/hr  01/09/19 11:15  01/09/19 19:25





  Lactated Ringers Solution  IV  01/10/19 06:00  Not Given





  ASDIR [facility]   





     





     





     





     


 


Magnesium Hydroxide  30 ml  01/09/19 11:15  





  Milk Of Magnesia -  PO   





  PRN PRN   





  CONSTIPATION   





     





     





     


 


Multivitamins/Minerals/Vitamin C  1 tab  01/10/19 10:00  





  Tab-A-Vit -  PO   





  DAILY [facility]   





     





     





     





     


 


Ondansetron HCl  4 mg  01/09/19 11:15  





  Zofran Injection  IVPUSH   





  Q6H PRN   





  NAUSEA   





     





     





     


 


Ondansetron HCl  4 mg  01/09/19 13:56  





  Zofran Injection  IVPUSH   





  Q6H PRN   





  NAUSEA AND/OR VOMITING   





     





     





     


 


Oxycodone HCl  5 mg  01/09/19 13:56  





  Roxicodone -  PO   





  Q3H PRN   





  PAIN LEVEL 1-5   





     





     





     


 


Oxycodone HCl  10 mg  01/09/19 13:56  01/10/19 01:21





  Roxicodone -  PO   10 mg





  Q3H PRN   Administration





  PAIN LEVEL 6-10   





     





     





     


 


Oxycodone HCl  10 mg  01/09/19 22:00  01/09/19 21:23





  Oxycontin -  PO  01/12/19 13:56  10 mg





  BID [facility]   Administration





     





     





     





     


 


Pantoprazole Sodium  40 mg  01/10/19 10:00  





  Protonix -  PO   





  DAILY [facility]   





     





     





     





     


 


Promethazine HCl  12.5 mg  01/09/19 13:56  





  Phenergan Injection -  IVPUSH   





  Q6H PRN   





  NAUSEA-FOR RESCUE AFTER 15 MIN   





     





     





     


 


Senna/Docusate Sodium  2 tablet  01/09/19 22:00  01/09/19 21:24





  Pericolace -  PO   2 tablet





  BID [facility]   Administration





     





     





     





     














Problem List





- Problems


(1) Status post total left knee replacement


Assessment/Plan: 


Continue ortho regimen


Monitor for neurovascular changes


Continue pain meds


Incentive spirometer


PT


Code(s): Z96.652 - PRESENCE OF LEFT ARTIFICIAL KNEE JOINT   





(2) Osteoarthritis


Assessment/Plan: 


See above


Code(s): M19.90 - UNSPECIFIED OSTEOARTHRITIS, UNSPECIFIED SITE   





(3) Anemia


Assessment/Plan: 


Stable


Will transfuse if Hgb < 7.0


Monitor CBC





Code(s): D64.9 - ANEMIA, UNSPECIFIED   





(4) HTN (hypertension)


Assessment/Plan: 


Controlled


Monitor BP


Continue home med


Monitor renal function


Code(s): I10 - ESSENTIAL (PRIMARY) HYPERTENSION   





(5) GERD (gastroesophageal reflux disease)


Assessment/Plan: 


Continue PPI





Code(s): K21.9 - GASTRO-ESOPHAGEAL REFLUX DISEASE WITHOUT ESOPHAGITIS   





Assessment/Plan


This is a 54y/o woman with a PMHx of HTN, Anemia, OA, s/p L-TKR POD #0





Plan:


See Problem List


Ortho following


Repeat CBC in am


Incentive Spirometer


Continue home meds


FEN- PO fluids as tolerated, Replete lytes prn, Low Na Diet


DVT ppx- OOB, SCDs, Continue Asa











Visit type





- Emergency Visit


Emergency Visit: No





- New Patient


This patient is new to me today: Yes


Date on this admission: 01/09/19





- Critical Care


Critical Care patient: No

## 2019-01-09 NOTE — OP
DATE OF OPERATION:  

 

SURGEON:  Javon Canales MD

 

ASSISTANTS:  Martin Canales MD; Butch Hamilton PA-C

 

PREOPERATIVE DIAGNOSIS:  Left tricompartment osteoarthritis, knee.

 

POSTOPERATIVE  DIAGNOSIS:  Left tricompartment osteoarthritis, knee.

 

OPERATION PERFORMED:  Left cemented posterior stabilized total knee arthroplasty

([place]).

 

ANESTHESIA:  Conscious sedation with peripheral block and spinal anesthesia.

 

ANTIBIOTICS GIVEN:  Kefzol 2 g, 1 g vancomycin preoperatively, 1 g Kefzol given 
at

the time of release of the tourniquet.

 

TOURNIQUET TIME:  An hour and 15 minutes.

 

OPERATION DETAILS:  Patient correctly identified, brought to the operating 
room. 

Lower extremity prepped free, draped in the routine manner with Betadine scrub

solution, wiped off with alcohol, DuraPrep applied.  Left lower extremity was 
free

draped.  Preoperative evaluation revealed a fixed varus deformity but no fixed

flexion deformity.  Range of movement on the table was 0 to about 90 degrees. 

Midline incision utilized.  The dissection was taken through the skin and

subcutaneous tissue to the level of the vastus medialis muscle.  The soft tissue

elements of the vastus medialis were lifted bringing about a complete freeing 
of the

vastus all the way down to the linea aspera and the distal end of the femur.  
The

continuation of the dissection was along the tibial tubercle on the medial 
aspect of

the proximal tibial soft tissue elements and these were lifted directly off the 
bone

bed using sharp dissection.  The patellar fat pad was resected, probably 3/4 of 
the

fat pad being resected accordingly.

 

The patella was held and the 1st cut was a patellar cut made from patellar 
ligament

to quadriceps tendon and the lollipop jig device placed for a 27-mm parapatellar

button and the drill holes inserted appropriately for the patellar component.  
The

knee was then flexed.  The entire vastus, sub vastus approach enabled the 
quadriceps

mechanism and lateral patella in 1 unit to be removed to be displaced 
laterally. 

Once this had been performed the tibia was subluxed forward.  The 1st cut was 
the

tibia.  This was made with the extramedullary jig alignment from centered on to 
the

tibial eminence to the center to the talus which gave us  straight cut and 
measured

for a size 6 tibial tray.  The femur was cut in accordance with Stevens lines 
of

the epicondylar axis and the appropriate jig system seated and jig cuts made to

provide a seating of the femoral component that measured size 5.  This was for a

Triathlon size 5 component and the external rotation was 4 degrees.  The joint 
line

was maintained at its anatomical position so 8 mm resected off the distal femur 
was resected and the femur

was cut to 4 degrees in valgus.

 

Once all cuts were made the trialing components were inserted, a size 5 femur, 
a size

6 tibia with a size 11-mm polyethylene spacer.  Extension gaps were even at 11 
mm.

The stability of the coronal sagittal plane with this trialing was excellent.  
The

patella tracked slightly abnormally and was inclined to sublux and a lateral 
release

was performed which improved the situation completely.  Once this had been 
performed

and we were happy with the sizings all trial components were removed.  The bone 
bed

was thoroughly lavaged with pulsed lavage.  Additional small holes were made in 
the

bone bed because of the sclerotic bone quality.  Cementing was in 1 stage with

regular CMW cement.  The cement was pumped into the interstices and the 
components

then seated appropriately.  A size 11-mm polyethylene posterior stabilized liner

inserted.  All extraneous cement was removed.  Tracking of the patella was much

improved with a negative thumb test.  It tracked normally and the range of 
movement

with the gravity fall test was from 0 to 120 degrees but complete stability in 
the

coronal sagittal rotational plane both left- and right-hand side and the 
sagittal plane

tested with an anterior and posterior drawer test.  These were stable.

 

The wounds again thoroughly lavaged.  All extraneous cement and soft tissue 
removed. 

Soft closure:  The soft tissue elements distally, that is of the tibia and the 
medial

aspect of the soft tissues, were approximated with No. 1 Vicryl, subcutaneous 1 
and

2-0 Vicryl, skin 3-0 Monocryl with Steri-Strips.  Drainage:  A 1/8-inch Hemovac 
to

the medial distal aspect of the femur where the subvastus approach was 
performed.  No

complications.  Operation went well.

 

 

MD AYANA Becerra/[id number]

DD: 01/09/2019 11:17

DT: 01/09/2019 12:33

Job #:  [id number]

MTDD

## 2019-01-09 NOTE — PN
Progress Note (short form)





- Note


Progress Note: 


S/P L TKA  POD #0





- Pain control.


-DVT PPx:


   -Chemical: ASA 81 mg po BID x 6 weeks


   -Mechanical: ZOHRA's, SCD's


-Incentive Spirometry.


-PT/OT/Rehab, OOB.


-WBAT LLE        RLE.


-f/u drain output


-f/u am labs.


-Care per medical hospitalist team.


-Discharge planning: f/u [facility] [place] office on


  Call for appointment: [phone]


-Will follow.





Javon Canales MD (Orthopaedic Surgery)

## 2019-01-10 LAB
ANION GAP SERPL CALC-SCNC: 10 MMOL/L (ref 8–16)
BUN SERPL-MCNC: 13 MG/DL (ref 7–18)
CALCIUM SERPL-MCNC: 8.8 MG/DL (ref 8.4–10.2)
CHLORIDE SERPL-SCNC: 100 MMOL/L (ref 98–107)
CO2 SERPL-SCNC: 24 MMOL/L (ref 22–28)
CREAT SERPL-MCNC: 0.9 MG/DL (ref 0.6–1.3)
DEPRECATED RDW RBC AUTO: 20.3 % (ref 11.6–15.6)
GLUCOSE SERPL-MCNC: 123 MG/DL (ref 74–106)
HCT VFR BLD CALC: 34.2 % (ref 32.4–45.2)
HGB BLD-MCNC: 10.6 GM/DL (ref 10.7–15.3)
MAGNESIUM SERPL-MCNC: 1.8 MG/DL (ref 1.8–2.4)
MCH RBC QN AUTO: 21 PG (ref 25.7–33.7)
MCHC RBC AUTO-ENTMCNC: 31 G/DL (ref 32–36)
MCV RBC: 67.6 FL (ref 80–96)
PLATELET # BLD AUTO: 365 K/MM3 (ref 134–434)
PMV BLD: 8.8 FL (ref 7.5–11.1)
POTASSIUM SERPLBLD-SCNC: 3.6 MMOL/L (ref 3.5–5.1)
RBC # BLD AUTO: 5.06 M/MM3 (ref 3.6–5.2)
SODIUM SERPL-SCNC: 134 MMOL/L (ref 136–145)
WBC # BLD AUTO: 17.2 K/MM3 (ref 4–10.8)

## 2019-01-10 RX ADMIN — DOCUSATE SODIUM,SENNOSIDES SCH TABLET: 50; 8.6 TABLET, FILM COATED ORAL at 21:55

## 2019-01-10 RX ADMIN — ASPIRIN SCH MG: 81 TABLET, COATED ORAL at 09:18

## 2019-01-10 RX ADMIN — CEFAZOLIN SODIUM SCH: 2 SOLUTION INTRAVENOUS at 07:07

## 2019-01-10 RX ADMIN — OXYCODONE HYDROCHLORIDE SCH MG: 10 TABLET, FILM COATED, EXTENDED RELEASE ORAL at 09:19

## 2019-01-10 RX ADMIN — ASPIRIN SCH MG: 81 TABLET, COATED ORAL at 21:55

## 2019-01-10 RX ADMIN — HYDROCHLOROTHIAZIDE SCH MG: 25 TABLET ORAL at 09:18

## 2019-01-10 RX ADMIN — MULTIVITAMIN TABLET SCH TAB: TABLET at 09:18

## 2019-01-10 RX ADMIN — ACETAMINOPHEN PRN MG: 10 INJECTION, SOLUTION INTRAVENOUS at 23:27

## 2019-01-10 RX ADMIN — ACETAMINOPHEN PRN MG: 10 INJECTION, SOLUTION INTRAVENOUS at 17:17

## 2019-01-10 RX ADMIN — OXYCODONE HYDROCHLORIDE SCH MG: 10 TABLET, FILM COATED, EXTENDED RELEASE ORAL at 21:55

## 2019-01-10 RX ADMIN — DOCUSATE SODIUM,SENNOSIDES SCH TABLET: 50; 8.6 TABLET, FILM COATED ORAL at 09:18

## 2019-01-10 RX ADMIN — PANTOPRAZOLE SODIUM SCH MG: 40 TABLET, DELAYED RELEASE ORAL at 09:18

## 2019-01-10 RX ADMIN — CEFAZOLIN ONE: 1 INJECTION, POWDER, FOR SOLUTION INTRAVENOUS at 07:07

## 2019-01-10 NOTE — PN
Progress Note (short form)





- Note


Progress Note: 





POD #1 - s/p left total knee replacement under spinal anesthesia and peripheral 

nerve blocks for postop pain 


management. Pt. doing well, sitting up comfortably in chair with legs up. No 

complaints. Pain control is good - 


approximately 4-5/10.  No apparent anesthetic complications noted. Continue 

current regimen.

## 2019-01-10 NOTE — PN
Physical Exam: 


SUBJECTIVE: Patient seen and examined in recliner. 9/10 left knee pain. 








OBJECTIVE:





 Vital Signs











 Period  Temp  Pulse  Resp  BP Sys/Dent  Pulse Ox


 


 Last 24 Hr  97.8 F-99.0 F    16-21  103-156/59-80  95-98











GENERAL: The patient is awake, alert, and fully oriented, in no acute distress.


LUNGS: Breath sounds equal, clear to auscultation


HEART: Regular rate and rhythm, S1, S2 


ABDOMEN: Soft, nontender, nondistended


LEFT LOWER EXTREMITY: SCDs, TEDs, surgical wrap c/d/i; 3/5 flexion/extension 

foot; sensory intact 


NEUROLOGICAL: Cranial nerves II through XII grossly intact. Normal speech, gait 

not observed.

















 Laboratory Results - last 24 hr











  01/10/19 01/10/19





  07:35 07:35


 


WBC  17.2 H 


 


RBC  5.06 


 


Hgb  10.6 L 


 


Hct  34.2 


 


MCV  67.6 L 


 


MCH  21.0 L 


 


MCHC  31.0 L 


 


RDW  20.3 H 


 


Plt Count  365 


 


MPV  8.8 


 


Sodium   134 L


 


Potassium   3.6


 


Chloride   100


 


Carbon Dioxide   24


 


Anion Gap   10


 


BUN   13


 


Creatinine   0.9


 


Creat Clearance w eGFR   > 60


 


Random Glucose   123 H


 


Calcium   8.8


 


Magnesium   1.8








                           


 Current Medications











Generic Name Dose Route Start Last Admin





  Trade Name Freq  PRN Reason Stop Dose Admin


 


Acetaminophen  1,000 mg  01/10/19 16:20  





  Ofirmev Injection -  IVPB   





  Q6H PRN   





  PAIN LEVEL 6-10   





     





     





     


 


Al Hydroxide/Mg Hydroxide  30 ml  01/09/19 11:15  





  Mylanta Oral Suspension -  PO   





  Q4H PRN   





  DYSPEPSIA   





     





     





     


 


Aspirin  81 mg  01/09/19 22:00  01/10/19 09:18





  Ecotrin -  PO   81 mg





  BID OPHELIA   Administration





     





     





     





     


 


Hydrochlorothiazide  25 mg  01/10/19 10:00  01/10/19 09:18





  Hctz -  PO   25 mg





  DAILY OPHELIA   Administration





     





     





     





     


 


Magnesium Hydroxide  30 ml  01/09/19 11:15  





  Milk Of Magnesia -  PO   





  PRN PRN   





  CONSTIPATION   





     





     





     


 


Multivitamins/Minerals/Vitamin C  1 tab  01/10/19 10:00  01/10/19 09:18





  Tab-A-Vit -  PO   1 tab





  DAILY OPHELIA   Administration





     





     





     





     


 


Ondansetron HCl  4 mg  01/09/19 11:15  





  Zofran Injection  IVPUSH   





  Q6H PRN   





  NAUSEA   





     





     





     


 


Ondansetron HCl  4 mg  01/09/19 13:56  





  Zofran Injection  IVPUSH   





  Q6H PRN   





  NAUSEA AND/OR VOMITING   





     





     





     


 


Oxycodone HCl  5 mg  01/09/19 13:56  





  Roxicodone -  PO   





  Q3H PRN   





  PAIN LEVEL 1-5   





     





     





     


 


Oxycodone HCl  10 mg  01/09/19 13:56  01/10/19 13:29





  Roxicodone -  PO   10 mg





  Q3H PRN   Administration





  PAIN LEVEL 6-10   





     





     





     


 


Oxycodone HCl  10 mg  01/09/19 22:00  01/10/19 09:19





  Oxycontin -  PO  01/12/19 13:56  10 mg





  BID OPHELIA   Administration





     





     





     





     


 


Pantoprazole Sodium  40 mg  01/10/19 10:00  01/10/19 09:18





  Protonix -  PO   40 mg





  DAILY OPHELIA   Administration





     





     





     





     


 


Promethazine HCl  12.5 mg  01/09/19 13:56  





  Phenergan Injection -  IVPUSH   





  Q6H PRN   





  NAUSEA-FOR RESCUE AFTER 15 MIN   





     





     





     


 


Senna/Docusate Sodium  2 tablet  01/09/19 22:00  01/10/19 09:18





  Pericolace -  PO   2 tablet





  BID OPHELIA   Administration





     





     





     





     











ASSESSMENT/PLAN:


53 year-old female with a PMH significant for HTN, anemia, osteoarthritis, s/p 

right TKR, and GERD. Admitted for 





Osteoarthritis of left knee s/p right total knee artroplasty on 1/9/19


   --POD #1


   --perioperative antibiotics per surgery


   --pain not well-controlled, add IV Tylenol q6h scheduled 


   --ASA 81mg BID


   --protonix 


   --bowel regimen


   --incentive spirometry


   --Hemovac drain, monitor output





Anemia


   --h/h stble





Hypertension


   --BP stable


   --continue HCTZ





GERD


   --continue Protonix





FEN


   Fluids: PO intake adequate


   Electrolytes: replete as indicated


   Nutrition: low sodium





DVT prophylaxis: ASA 81mg BID, oob, ambulation, SCDs, TEDs





Physical therapy





Dispo: continues to require inpatient care. Full code. 











Visit type





- Emergency Visit


Emergency Visit: No





- New Patient


This patient is new to me today: Yes


Date on this admission: 01/10/19





- Critical Care


Critical Care patient: No

## 2019-01-10 NOTE — PN
Progress Note (short form)





- Note


Progress Note: 





pOD#1 





Pt seen earlier this am. Complaints of some pain behind her left knee. No CP/

SOB oob and ambulate to the restroom.





 Vital Signs











 Period  Temp  Pulse  Resp  BP Sys/Dent  Pulse Ox


 


 Last 24 Hr  98.2 F-99.0 F    17-21  120-156/66-80  95-97








LUIS ANGEL:225 serosangrenous





GEN: A&0x3, NAD


CV: RR, tachycardic


Lungs: CTA b/l


LE: 5/5 dorsi/plantar flexion b/l. Able to straight leg raise on the right, 

weakness on the left. Dressing c/d/i. No calf tenderness or swelling noted b/l.





 CBC, BMP





 01/10/19 07:35 





 01/10/19 07:35 





A/p: 52 yo female s/p Left knee replacment, POD#1


   Continue diet as tolerated 


   Oob with assistance and PT


   DVT ppx with SCDs/ambulation and aspirin 81 mg BID


   Continue hemovac


   Pt with mild tachycardia, recheck . She states that her HR is usually 

in the high 90's. Will add tylenol IV for pain management.


   D/w Dr. Canales

## 2019-01-11 LAB
ANION GAP SERPL CALC-SCNC: 7 MMOL/L (ref 8–16)
ANISOCYTOSIS BLD QL: (no result)
BASOPHILS # BLD: 0.6 % (ref 0–2)
BUN SERPL-MCNC: 12 MG/DL (ref 7–18)
CALCIUM SERPL-MCNC: 8.5 MG/DL (ref 8.4–10.2)
CHLORIDE SERPL-SCNC: 100 MMOL/L (ref 98–107)
CO2 SERPL-SCNC: 28 MMOL/L (ref 22–28)
CREAT SERPL-MCNC: 0.8 MG/DL (ref 0.6–1.3)
DEPRECATED RDW RBC AUTO: 19.5 % (ref 11.6–15.6)
EOSINOPHIL # BLD: 1.1 % (ref 0–4.5)
GLUCOSE SERPL-MCNC: 102 MG/DL (ref 74–106)
HCT VFR BLD CALC: 33.5 % (ref 32.4–45.2)
HGB BLD-MCNC: 10.2 GM/DL (ref 10.7–15.3)
LYMPHOCYTES # BLD: 12 % (ref 8–40)
MCH RBC QN AUTO: 20.4 PG (ref 25.7–33.7)
MCHC RBC AUTO-ENTMCNC: 30.5 G/DL (ref 32–36)
MCV RBC: 66.8 FL (ref 80–96)
MONOCYTES # BLD AUTO: 6.5 % (ref 3.8–10.2)
NEUTROPHILS # BLD: 79.8 % (ref 42.8–82.8)
PLATELET # BLD AUTO: 363 K/MM3 (ref 134–434)
PMV BLD: 8.7 FL (ref 7.5–11.1)
POTASSIUM SERPLBLD-SCNC: 3.1 MMOL/L (ref 3.5–5.1)
RBC # BLD AUTO: 5.01 M/MM3 (ref 3.6–5.2)
RBC MORPH BLD: YES
SODIUM SERPL-SCNC: 135 MMOL/L (ref 136–145)
WBC # BLD AUTO: 13.3 K/MM3 (ref 4–10.8)

## 2019-01-11 RX ADMIN — ASPIRIN SCH MG: 81 TABLET, COATED ORAL at 21:53

## 2019-01-11 RX ADMIN — HYDROCHLOROTHIAZIDE SCH MG: 25 TABLET ORAL at 09:20

## 2019-01-11 RX ADMIN — METOPROLOL TARTRATE SCH MG: 25 TABLET, FILM COATED ORAL at 21:53

## 2019-01-11 RX ADMIN — PANTOPRAZOLE SODIUM SCH MG: 40 TABLET, DELAYED RELEASE ORAL at 09:20

## 2019-01-11 RX ADMIN — OXYCODONE HYDROCHLORIDE SCH MG: 10 TABLET, FILM COATED, EXTENDED RELEASE ORAL at 09:19

## 2019-01-11 RX ADMIN — DOCUSATE SODIUM,SENNOSIDES SCH TABLET: 50; 8.6 TABLET, FILM COATED ORAL at 09:18

## 2019-01-11 RX ADMIN — ASPIRIN SCH MG: 81 TABLET, COATED ORAL at 09:19

## 2019-01-11 RX ADMIN — OXYCODONE HYDROCHLORIDE SCH MG: 10 TABLET, FILM COATED, EXTENDED RELEASE ORAL at 21:53

## 2019-01-11 RX ADMIN — POTASSIUM CHLORIDE SCH MEQ: 1500 TABLET, EXTENDED RELEASE ORAL at 09:49

## 2019-01-11 RX ADMIN — POTASSIUM CHLORIDE SCH MEQ: 1500 TABLET, EXTENDED RELEASE ORAL at 16:12

## 2019-01-11 RX ADMIN — MULTIVITAMIN TABLET SCH TAB: TABLET at 09:20

## 2019-01-11 RX ADMIN — DOCUSATE SODIUM,SENNOSIDES SCH TABLET: 50; 8.6 TABLET, FILM COATED ORAL at 21:53

## 2019-01-11 NOTE — EKG
Test Reason : 

Blood Pressure : ***/*** mmHG

Vent. Rate : 111 BPM     Atrial Rate : 111 BPM

   P-R Int : 142 ms          QRS Dur : 094 ms

    QT Int : 320 ms       P-R-T Axes : 060 003 022 degrees

   QTc Int : 435 ms

 

SINUS TACHYCARDIA

POSSIBLE LEFT ATRIAL ENLARGEMENT

ANTERIOR INFARCT , AGE UNDETERMINED

ABNORMAL ECG

NO PREVIOUS ECGS AVAILABLE

Confirmed by LESLIE RIZVI, CHINA ([id number]) on 1/11/2019 11:39:08 AM

 

Referred By: Javon Canales           Confirmed By:CHINA NELSON MD

## 2019-01-11 NOTE — PN
Physical Exam: 


SUBJECTIVE: Patient seen and examined oob to chair. Earlier seen ambulating 

with PT.








OBJECTIVE:





 Vital Signs











 Period  Temp  Pulse  Resp  BP Sys/Dent  Pulse Ox


 


 Last 24 Hr  97.6 F-98.6 F  104-129  19-19  120-134/62-69  94-97











GENERAL: The patient is awake, alert, and fully oriented, in no acute distress.


LUNGS: Breath sounds equal, clear to auscultation


HEART: Regular rate and rhythm, S1, S2 


ABDOMEN: Soft, nontender, nondistended


LEFT LOWER EXTREMITY: SCDs, TEDs, surgical wrap c/d/i; 3/5 flexion/extension 

foot; sensory intact 


NEUROLOGICAL: Cranial nerves II through XII grossly intact. Normal speech, 

steady gait with walker











 Laboratory Results - last 24 hr











  01/11/19 01/11/19





  07:45 07:45


 


WBC  13.3 H 


 


RBC  5.01 


 


Hgb  10.2 L 


 


Hct  33.5 


 


MCV  66.8 L 


 


MCH  20.4 L 


 


MCHC  30.5 L 


 


RDW  19.5 H 


 


Plt Count  363 


 


MPV  8.7 


 


Absolute Neuts (auto)  10.6 


 


Neutrophils %  79.8 


 


Lymphocytes %  12.0 


 


Monocytes %  6.5 


 


Eosinophils %  1.1 


 


Basophils %  0.6 


 


Hypochromia  2+ 


 


Anisocytosis  2+ 


 


Microcytosis  1+ 


 


Sodium   135 L


 


Potassium   3.1 L


 


Chloride   100


 


Carbon Dioxide   28


 


Anion Gap   7 L


 


BUN   12


 


Creatinine   0.8


 


Creat Clearance w eGFR   > 60


 


Random Glucose   102


 


Calcium   8.5








                           Active Medications











Generic Name Dose Route Start Last Admin





  Trade Name Freq  PRN Reason Stop Dose Admin


 


Acetaminophen  1,000 mg  01/10/19 16:20  01/10/19 23:27





  Ofirmev Injection -  IVPB   1,000 mg





  Q6H PRN   Administration





  PAIN LEVEL 6-10   





     





     





     


 


Al Hydroxide/Mg Hydroxide  30 ml  01/09/19 11:15  





  Mylanta Oral Suspension -  PO   





  Q4H PRN   





  DYSPEPSIA   





     





     





     


 


Aspirin  81 mg  01/09/19 22:00  01/11/19 09:19





  Ecotrin -  PO   81 mg





  BID OPHELIA   Administration





     





     





     





     


 


Hydrochlorothiazide  25 mg  01/10/19 10:00  01/11/19 09:20





  Hctz -  PO   25 mg





  DAILY OPHELIA   Administration





     





     





     





     


 


Lorazepam  0.5 mg  01/11/19 08:06  





  Ativan -  PO   





  TID PRN   





  ANXIETY   





     





     





     


 


Magnesium Hydroxide  30 ml  01/09/19 11:15  





  Milk Of Magnesia -  PO   





  PRN PRN   





  CONSTIPATION   





     





     





     


 


Multivitamins/Minerals/Vitamin C  1 tab  01/10/19 10:00  01/11/19 09:20





  Tab-A-Vit -  PO   1 tab





  DAILY OPHELIA   Administration





     





     





     





     


 


Ondansetron HCl  4 mg  01/09/19 11:15  





  Zofran Injection  IVPUSH   





  Q6H PRN   





  NAUSEA   





     





     





     


 


Ondansetron HCl  4 mg  01/09/19 13:56  





  Zofran Injection  IVPUSH   





  Q6H PRN   





  NAUSEA AND/OR VOMITING   





     





     





     


 


Oxycodone HCl  5 mg  01/09/19 13:56  





  Roxicodone -  PO   





  Q3H PRN   





  PAIN LEVEL 1-5   





     





     





     


 


Oxycodone HCl  10 mg  01/09/19 13:56  01/11/19 09:18





  Roxicodone -  PO   10 mg





  Q3H PRN   Administration





  PAIN LEVEL 6-10   





     





     





     


 


Oxycodone HCl  10 mg  01/09/19 22:00  01/11/19 09:19





  Oxycontin -  PO  01/12/19 13:56  10 mg





  BID OPHELIA   Administration





     





     





     





     


 


Pantoprazole Sodium  40 mg  01/10/19 10:00  01/11/19 09:20





  Protonix -  PO   40 mg





  DAILY OPHELIA   Administration





     





     





     





     


 


Potassium Chloride  40 meq  01/11/19 09:30  01/11/19 09:49





  K-Dur -  PO  01/11/19 15:31  40 meq





  Q6H OPHELIA   Administration





     





     





     





     


 


Senna/Docusate Sodium  2 tablet  01/09/19 22:00  01/11/19 09:18





  Pericolace -  PO   2 tablet





  BID OPHELIA   Administration





     





     





     





     











ASSESSMENT/PLAN:


53 year-old female with a PMH significant for HTN, anemia, osteoarthritis, s/p 

right TKR, and GERD. Admitted for right total knee arthroplasty.





Osteoarthritis of left knee s/p right total knee arthroplasty on 1/9/19


   --POD #2


   --perioperative antibiotics complete


   --pain better managed with Tylenol


   --ASA 81mg BID


   --protonix 


   --bowel regimen


   --incentive spirometry


   --Hemovac drain pulled earlier today by surgery





Anemia


   --h/h stable





Hypertension


Tachycardia


   --BP mildly elevated and tachycardic


   --metoprolol 25mg x 1


   --continue HCTZ





GERD


   --continue Protonix





FEN


   Fluids: PO intake adequate


   Electrolytes: replete as indicated


   Nutrition: low sodium





DVT prophylaxis: ASA 81mg BID, oob, ambulation, SCDs, TEDs





Physical therapy





Dispo: Thank you for this consultative opportunity. Full code. 








Visit type





- Emergency Visit


Emergency Visit: Yes


ED Registration Date: 01/09/19


Care time: The patient presented to the Emergency Department on the above date 

and was hospitalized for further evaluation of their emergent condition.





- New Patient


This patient is new to me today: No





- Critical Care


Critical Care patient: No

## 2019-01-11 NOTE — DS
Physical Exam: 


SUBJECTIVE: Patient seen and examined








OBJECTIVE:





 Vital Signs











Temperature  98.6 F   01/11/19 06:42


 


Pulse Rate  129 H  01/11/19 06:42


 


Respiratory Rate  19   01/11/19 06:42


 


Blood Pressure  134/69   01/11/19 06:42


 


O2 Sat by Pulse Oximetry (%)  97   01/11/19 08:05








PHYSICAL EXAM





GENERAL: The patient is awake, alert, and fully oriented, in no acute distress.


HEAD: Normal with no signs of trauma.


EYES: PERRL, extraocular movements intact, sclera anicteric, conjunctiva clear. 


ENT: Ears normal, nares patent, oropharynx clear without exudates, moist mucous 

membranes.


NECK: Trachea midline, full range of motion, supple. 


LUNGS: Breath sounds equal, clear to auscultation bilaterally, no wheezes, no 

crackles, no accessory muscle use. 


HEART: Regular rate and rhythm, S1, S2 without murmur, rub or gallop.


ABDOMEN: Soft, nontender, nondistended, normoactive bowel sounds, no guarding, 

no rebound, no hepatosplenomegaly, no masses.


EXTREMITIES: 2+ pulses, warm, well-perfused, no edema. 


NEUROLOGICAL: Cranial nerves II through XII grossly intact. Normal speech, gait 

not observed.


PSYCH: Normal mood, normal affect.


SKIN: Warm, dry, normal turgor, no rashes or lesions noted.





LABS


CBC,CMP











WBC  13.3 K/mm3 (4.0-10.8)  H  01/11/19  07:45    


 


RBC  5.01 M/mm3 (3.60-5.2)   01/11/19  07:45    


 


Hgb  10.2 GM/dl (10.7-15.3)  L  01/11/19  07:45    


 


Hct  33.5 % (32.4-45.2)   01/11/19  07:45    


 


MCV  66.8 fl (80-96)  L  01/11/19  07:45    


 


MCH  20.4 pg (25.7-33.7)  L  01/11/19  07:45    


 


MCHC  30.5 g/dl (32.0-36.0)  L  01/11/19  07:45    


 


RDW  19.5 % (11.6-15.6)  H  01/11/19  07:45    


 


Plt Count  363 K/MM3 (134-434)   01/11/19  07:45    


 


MPV  8.7 fl (7.5-11.1)   01/11/19  07:45    


 


Absolute Neuts (auto)  10.6 K/mm3  01/11/19  07:45    


 


Neutrophils %  79.8 % (42.8-82.8)   01/11/19  07:45    


 


Lymphocytes %  12.0 % (8-40)   01/11/19  07:45    


 


Monocytes %  6.5 % (3.8-10.2)   01/11/19  07:45    


 


Eosinophils %  1.1 % (0-4.5)   01/11/19  07:45    


 


Basophils %  0.6 % (0-2.0)   01/11/19  07:45    


 


Sodium  135 mmol/L (136-145)  L  01/11/19  07:45    


 


Potassium  3.1 mmol/L (3.5-5.1)  L  01/11/19  07:45    


 


Chloride  100 mmol/L ()   01/11/19  07:45    


 


Carbon Dioxide  28 mmol/L (22-28)   01/11/19  07:45    


 


Anion Gap  7 MMOL/L (8-16)  L  01/11/19  07:45    


 


BUN  12 mg/dl (7-18)   01/11/19  07:45    


 


Creatinine  0.8 mg/dl (0.6-1.3)   01/11/19  07:45    


 


Creat Clearance w eGFR  > 60  (>60)   01/11/19  07:45    


 


Random Glucose  102 mg/dl ()   01/11/19  07:45    


 


Calcium  8.5 mg/dl (8.4-10.2)   01/11/19  07:45    


 


Magnesium  1.8 mg/dL (1.8-2.4)   01/10/19  07:35    











HOSPITAL COURSE:





Date of Admission:01/09/19





Date of Discharge: 01/11/19

## 2019-01-11 NOTE — PN
Progress Note (short form)





- Note


Progress Note: 





POD#2





Pt with complaints of pain behind her knee. Ambulating with PT, ROM 90 flexion. 

Having some discomfort with extension. No CP/SOB.





  Vital Signs











 Period  Temp  Pulse  Resp  BP Sys/Dent  Pulse Ox


 


 Last 24 Hr  97.6 F-98.6 F    18-19  124-153/61-75  94-97








GEN: A&0x3, NAD


CV: RR, mild tachycardia


Lungs: CTA b/l


Left leg: Dressing c/d/i. Mild swelling to the calf, no tenderness. Drain 

removed this am by GORDY Moran. Dressing remains c/d/i. Teds stockings 

in place. 5/5 dorsi/plantar flexion b/l. Drain outpt-30/40ml serosangrenous.





 CBC, BMP





 01/11/19 07:45 





 01/11/19 07:45 





A/p: 52 yo male s/p Left Total knee replacement, POD#2


   Continue OOB with PT


   Oral pain medications


   Stool softners


   Potassium repleted


   The pt remains tachycardic and slightly hypotensive today, spoke with the 

medical team and adding lopressor to her anti-hypertensive medications


   Duplex of lower ext to r/o DVT, mild swelling to the operative side as 

expected post-surgery and patient with persistent tachycardia. Continue aspirin

      81mg bid for DVT ppx.


   CBC/chem for the am


   D/w. Dr. Canales

## 2019-01-12 VITALS — DIASTOLIC BLOOD PRESSURE: 70 MMHG | HEART RATE: 106 BPM | SYSTOLIC BLOOD PRESSURE: 130 MMHG

## 2019-01-12 VITALS — TEMPERATURE: 98.6 F

## 2019-01-12 LAB
BASOPHILS # BLD: 0.4 % (ref 0–2)
DEPRECATED RDW RBC AUTO: 19.6 % (ref 11.6–15.6)
EOSINOPHIL # BLD: 2.3 % (ref 0–4.5)
HCT VFR BLD CALC: 31 % (ref 32.4–45.2)
HGB BLD-MCNC: 9.6 GM/DL (ref 10.7–15.3)
LYMPHOCYTES # BLD: 15.8 % (ref 8–40)
MCH RBC QN AUTO: 20.8 PG (ref 25.7–33.7)
MCHC RBC AUTO-ENTMCNC: 31.1 G/DL (ref 32–36)
MCV RBC: 66.9 FL (ref 80–96)
MONOCYTES # BLD AUTO: 6.6 % (ref 3.8–10.2)
NEUTROPHILS # BLD: 74.9 % (ref 42.8–82.8)
PLATELET # BLD AUTO: 345 K/MM3 (ref 134–434)
PMV BLD: 8.7 FL (ref 7.5–11.1)
RBC # BLD AUTO: 4.64 M/MM3 (ref 3.6–5.2)
WBC # BLD AUTO: 13.6 K/MM3 (ref 4–10.8)

## 2019-01-12 RX ADMIN — OXYCODONE HYDROCHLORIDE SCH MG: 10 TABLET, FILM COATED, EXTENDED RELEASE ORAL at 10:14

## 2019-01-12 RX ADMIN — DOCUSATE SODIUM,SENNOSIDES SCH TABLET: 50; 8.6 TABLET, FILM COATED ORAL at 10:14

## 2019-01-12 RX ADMIN — METOPROLOL TARTRATE SCH MG: 25 TABLET, FILM COATED ORAL at 10:15

## 2019-01-12 RX ADMIN — PANTOPRAZOLE SODIUM SCH MG: 40 TABLET, DELAYED RELEASE ORAL at 10:15

## 2019-01-12 RX ADMIN — ASPIRIN SCH MG: 81 TABLET, COATED ORAL at 10:14

## 2019-01-12 RX ADMIN — MULTIVITAMIN TABLET SCH TAB: TABLET at 10:15

## 2019-01-12 RX ADMIN — HYDROCHLOROTHIAZIDE SCH MG: 25 TABLET ORAL at 10:14

## 2019-01-12 NOTE — DS
Physical Exam: 


SUBJECTIVE: Patient seen and examined oob to chair. Pain is well-managed. 








OBJECTIVE:





 Vital Signs











 Period  Temp  Pulse  Resp  BP Sys/Dent  Pulse Ox


 


 Last 24 Hr  98.3 F-98.9 F  101-118  16-20  109-153/53-67  95-98








PHYSICAL EXAM





GENERAL: The patient is awake, alert, and fully oriented, in no acute distress.


LUNGS: Breath sounds equal, clear to auscultation


HEART: Regular rate and rhythm, S1, S2 


ABDOMEN: Soft, nontender, nondistended


LEFT LOWER EXTREMITY: SCDs, TEDs, surgical wrap c/d/i; 3/5 flexion/extension 

foot; sensory intact 


NEUROLOGICAL: Cranial nerves II through XII grossly intact. Normal speech, 

steady gait with walker





LABS


 Laboratory Results - last 24 hr











  01/11/19 01/12/19





  07:45 08:15


 


WBC   13.6 H


 


RBC   4.64


 


Hgb   9.6 L


 


Hct   31.0 L


 


MCV   66.9 L


 


MCH   20.8 L


 


MCHC   31.1 L


 


RDW   19.6 H


 


Plt Count   345


 


MPV   8.7


 


Absolute Neuts (auto)   10.2


 


Neutrophils %   74.9


 


Lymphocytes %   15.8


 


Monocytes %   6.6


 


Eosinophils %   2.3


 


Basophils %   0.4


 


Hypochromia  2+ 


 


Anisocytosis  2+ 


 


Microcytosis  1+ 











HOSPITAL COURSE:





Date of Admission:01/09/19





Date of Discharge: 01/12/19





Hospital course by problem list





53 year-old female with a PMH significant for HTN, anemia, osteoarthritis, s/p 

right TKR, and GERD. Admitted for right total knee arthroplasty.





Osteoarthritis of left knee s/p right total knee arthroplasty on 1/9/19


   --POD #3 date of discharge


   --perioperative antibiotics complete


   --pain well-controlled with PO meds


   --ASA 81mg BID x 6 weeks


   --protonix 


   --bowel regimen


   --incentive spirometry





Anemia


   --h/h stable





Hypertension


Tachycardia


   --BP mildly elevated and tachycardic; started on metoprolol 12.5mg BID


   --continued HCTZ





GERD


   --continued Protonix











Minutes to complete discharge: 35





Discharge Summary


Reason For Visit: OA LEFT KNEE


Current Active Problems





GERD (gastroesophageal reflux disease) (Acute)


Osteoarthritis (Acute)


Status post total left knee replacement (Acute)








Condition: Improved





- Instructions


Diet, Activity, Other Instructions: 


Dr. Canales Discharge Instructions for Knee Replacement





Post Operative Instructions





Physical activity


Physical Therapist will come to your home for the first 5 days. You will be set 

up with outpatient PT at your first post-operative visit. Use assistive devices 

for ambulation at all times. Weight bearing as tolerated on your surgical side. 

Do not put pillow under knee. May put pillow under heel.





Wound care


Leave your surgical dressing in place. Do not change the dressing until seen by 

your surgeon in the office. No baths or showers. Do not submerge your incision. 

Do not apply any ointments or lotions to your incision. Please call the office 

if your dressing is soiled/dirty or is falling off. Apply Graduated Compression 

Stockings (TEDS) to both lower extremities - remove daily for hygiene ONLY. 





Diet


There are no dietary restrictions. Eat healthy, high-fiber foods. Drink 6 to 8 

glasses of liquid each day. This will assist in keeping your bowels are regular.








Pain management


Any pain prescription medication ordered should be taken as prescribed for 

moderate to severe pain. Do not take additional Tylenol while taking Percocet.





Take Aspirin 81 mg two times a day for a total of 6 weeks to prevent blood 

clots.





Call Dr. Canales for any of the following:


Severe pain not relieved by medication


Fever of 101 or higher


Excessive bleeding or drainage on dressing


Inability to urinate


If you experience chest pain or shortness of breath, please seek emergency care 

immediately.





ISTOP: Oxycodone-Acetaminophen 5-325mg quantity 120pills for 30day supply 

filled 12/10/18 (RX written by Dr Javon Canales)





Please call the office at [phone] to confirm your post-op appointment 

for the week following surgery.





Referrals: 


Martin Canales MD [Staff Physician] - 


Disposition: HOME





- Home Medications


Comprehensive Discharge Medication List: 


Ambulatory Orders





Hydrochlorothiazide [Hctz -] 25 mg PO DAILY 05/14/18 


Oxycodone HCl 5 mg PO Q6H #28 tablet MDD 4 11/04/18 


Aspirin Coated [Ecotrin -] 81 mg PO BID #90 tablet.ec 01/11/19 


Docusate Sodium [Colace -] 100 mg PO BID #20 capsule 01/11/19 








This patient is new to me today: No


Emergency Visit: No


Critical Care patient: No





- Discharge Referral


Referred to [facility] No

## 2019-01-12 NOTE — PN
Physical Exam: 


SUBJECTIVE: Patient seen and examined








OBJECTIVE:





 Vital Signs











 Period  Temp  Pulse  Resp  BP Sys/Dent  Pulse Ox


 


 Last 24 Hr  98.3 F-98.9 F  101-118  16-20  109-153/53-67  95-98











GENERAL: The patient is awake, alert, and fully oriented, in no acute distress.


HEAD: Normal with no signs of trauma.


EYES: PERRL, extraocular movements intact, sclera anicteric, conjunctiva clear. 

No ptosis. 


ENT: Ears normal, nares patent, oropharynx clear without exudates, moist mucous 


membranes.


NECK: Trachea midline, full range of motion, supple. 


LUNGS: Breath sounds equal, clear to auscultation bilaterally, no wheezes, no 

crackles, no 


accessory muscle use. 


HEART: Regular rate and rhythm, S1, S2 without murmur, rub or gallop.


ABDOMEN: Soft, nontender, nondistended, normoactive bowel sounds, no guarding, 

no 


rebound, no hepatosplenomegaly, no masses.


EXTREMITIES: 2+ pulses, warm, well-perfused, no edema. 


NEUROLOGICAL: Cranial nerves II through XII grossly intact. Normal speech, gait 

not 


observed.


PSYCH: Normal mood, normal affect.


SKIN: Warm, dry, normal turgor, no rashes or lesions noted














 Laboratory Results - last 24 hr











  01/12/19





  08:15


 


WBC  13.6 H


 


RBC  4.64


 


Hgb  9.6 L


 


Hct  31.0 L


 


MCV  66.9 L


 


MCH  20.8 L


 


MCHC  31.1 L


 


RDW  19.6 H


 


Plt Count  345


 


MPV  8.7


 


Absolute Neuts (auto)  10.2


 


Neutrophils %  74.9


 


Lymphocytes %  15.8


 


Monocytes %  6.6


 


Eosinophils %  2.3


 


Basophils %  0.4








Active Medications











Generic Name Dose Route Start Last Admin





  Trade Name Freq  PRN Reason Stop Dose Admin


 


Acetaminophen  1,000 mg  01/10/19 16:20  01/10/19 23:27





  Ofirmev Injection -  IVPB   1,000 mg





  Q6H PRN   Administration





  PAIN LEVEL 6-10   





     





     





     


 


Al Hydroxide/Mg Hydroxide  30 ml  01/09/19 11:15  





  Mylanta Oral Suspension -  PO   





  Q4H PRN   





  DYSPEPSIA   





     





     





     


 


Aspirin  81 mg  01/09/19 22:00  01/12/19 10:14





  Ecotrin -  PO   81 mg





  BID OPHELIA   Administration





     





     





     





     


 


Hydrochlorothiazide  25 mg  01/10/19 10:00  01/12/19 10:14





  Hctz -  PO   25 mg





  DAILY OPHELIA   Administration





     





     





     





     


 


Lorazepam  0.5 mg  01/11/19 08:06  





  Ativan -  PO   





  TID PRN   





  ANXIETY   





     





     





     


 


Magnesium Hydroxide  30 ml  01/09/19 11:15  





  Milk Of Magnesia -  PO   





  PRN PRN   





  CONSTIPATION   





     





     





     


 


Metoprolol Tartrate  12.5 mg  01/11/19 22:00  01/12/19 10:15





  Lopressor -  PO   12.5 mg





  BID OPHELIA   Administration





     





     





     





     


 


Multivitamins/Minerals/Vitamin C  1 tab  01/10/19 10:00  01/12/19 10:15





  Tab-A-Vit -  PO   1 tab





  DAILY OPHELIA   Administration





     





     





     





     


 


Ondansetron HCl  4 mg  01/09/19 11:15  





  Zofran Injection  IVPUSH   





  Q6H PRN   





  NAUSEA   





     





     





     


 


Ondansetron HCl  4 mg  01/09/19 13:56  





  Zofran Injection  IVPUSH   





  Q6H PRN   





  NAUSEA AND/OR VOMITING   





     





     





     


 


Oxycodone HCl  5 mg  01/09/19 13:56  





  Roxicodone -  PO   





  Q3H PRN   





  PAIN LEVEL 1-5   





     





     





     


 


Oxycodone HCl  10 mg  01/09/19 13:56  01/12/19 02:30





  Roxicodone -  PO   10 mg





  Q3H PRN   Administration





  PAIN LEVEL 6-10   





     





     





     


 


Oxycodone HCl  10 mg  01/09/19 22:00  01/12/19 10:14





  Oxycontin -  PO  01/12/19 13:56  10 mg





  BID OPHELIA   Administration





     





     





     





     


 


Pantoprazole Sodium  40 mg  01/10/19 10:00  01/12/19 10:15





  Protonix -  PO   40 mg





  DAILY OPHELIA   Administration





     





     





     





     


 


Senna/Docusate Sodium  2 tablet  01/09/19 22:00  01/12/19 10:14





  Pericolace -  PO   2 tablet





  BID OPHELIA   Administration





     





     





     





     











ASSESSMENT/PLAN:

## 2019-01-12 NOTE — DS
Physical Exam: 


SUBJECTIVE: Patient seen and examined








OBJECTIVE:





 Vital Signs











 Period  Temp  Pulse  Resp  BP Sys/Dent  Pulse Ox


 


 Last 24 Hr  98.3 F-98.9 F  101-117  16-20  109-142/53-67  








PHYSICAL EXAM





GENERAL: The patient is awake, alert, and fully oriented, in no acute distress.


LUNGS: Breath sounds equal, clear to auscultation


HEART: Regular rate and rhythm, S1, S2 


ABDOMEN: Soft, nontender, nondistended


LEFT LOWER EXTREMITY: SCDs, TEDs, surgical wrap c/d/i; 3/5 flexion/extension 

foot; sensory intact 


NEUROLOGICAL: Cranial nerves II through XII grossly intact. Normal speech, 

steady gait with walker





LABS


 Laboratory Results - last 24 hr











  01/12/19





  08:15


 


WBC  13.6 H


 


RBC  4.64


 


Hgb  9.6 L


 


Hct  31.0 L


 


MCV  66.9 L


 


MCH  20.8 L


 


MCHC  31.1 L


 


RDW  19.6 H


 


Plt Count  345


 


MPV  8.7


 


Absolute Neuts (auto)  10.2


 


Neutrophils %  74.9


 


Lymphocytes %  15.8


 


Monocytes %  6.6


 


Eosinophils %  2.3


 


Basophils %  0.4











HOSPITAL COURSE:





Date of Admission:01/09/19





Date of Discharge: 01/12/19





Hospital course by problem list





53 year-old female with a PMH significant for HTN, anemia, osteoarthritis, s/p 

right TKR, and GERD. Admitted for right total knee arthroplasty.





Osteoarthritis of left knee s/p right total knee arthroplasty on 1/9/19 with 

Dr. Javon Canales


   --POD #3 date of discharge


   --perioperative antibiotics complete


   --pain well-managed with PO meds


   --ASA 81mg BID x 6 weeks


   


Anemia


   --h/h remained stable, no transfusions





Hypertension


Tachycardia


   --BP mildly elevated and tachycardic during hospital stay; metoprolol 12.5mg 

BID was started with recommendation patient continue upon discharge until 

followup with PCP


   --metoprolol 25mg x 1


   --continued HCTZ





GERD


   --continued Protonix











Minutes to complete discharge: 35





Discharge Summary


Reason For Visit: OA LEFT KNEE


Current Active Problems





GERD (gastroesophageal reflux disease) (Acute)


Osteoarthritis (Acute)


Status post total left knee replacement (Acute)








Condition: Improved





- Instructions


Diet, Activity, Other Instructions: 


Dr. Canales Discharge Instructions for Knee Replacement





Post Operative Instructions





Physical activity


Physical Therapist will come to your home for the first 5 days. You will be set 

up with outpatient PT at your first post-operative visit. Use assistive devices 

for ambulation at all times. Weight bearing as tolerated on your surgical side. 

Do not put pillow under knee. May put pillow under heel.





Wound care


Leave your surgical dressing in place. Do not change the dressing until seen by 

your surgeon in the office. No baths or showers. Do not submerge your incision. 

Do not apply any ointments or lotions to your incision. Please call the office 

if your dressing is soiled/dirty or is falling off. Apply Graduated Compression 

Stockings (TEDS) to both lower extremities - remove daily for hygiene ONLY. 





Diet


There are no dietary restrictions. Eat healthy, high-fiber foods. Drink 6 to 8 

glasses of liquid each day. This will assist in keeping your bowels are regular.








Pain management


Any pain prescription medication ordered should be taken as prescribed for 

moderate to severe pain. Do not take additional Tylenol while taking Percocet.





Take Aspirin 81 mg two times a day for a total of 6 weeks to prevent blood 

clots.





Call Dr. Canales for any of the following:


Severe pain not relieved by medication


Fever of 101 or higher


Excessive bleeding or drainage on dressing


Inability to urinate


If you experience chest pain or shortness of breath, please seek emergency care 

immediately.





ISTOP: Oxycodone-Acetaminophen 5-325mg quantity 120pills for 30day supply 

filled 12/10/18 (RX written by Dr Javon Canales)





Please call the office at [phone] to confirm your post-op appointment 

for the week following surgery.





**********************************************





Your blood pressure and heart rate were elevated during your hospital stay. You 

were treated with metoprolol 12.5mg twice a day. It is recommended you continue 

this medication until you follow up with your primary care provider Dr. Bland. 

A prescription has been sent to your pharmacy for a 30-day supply. Please see 

Dr. Bland within one week of your discharge to have your blood pressure checked.





Referrals: 


Martin Canales MD [Staff Physician] - 


Disposition: VNS/HOME HEALTH CARE





- Home Medications


Comprehensive Discharge Medication List: 


Ambulatory Orders





Hydrochlorothiazide [Hctz -] 25 mg PO DAILY 05/14/18 


Oxycodone HCl 5 mg PO Q6H #28 tablet MDD 4 11/04/18 


Aspirin Coated [Ecotrin -] 81 mg PO BID #90 tablet.ec 01/11/19 


Docusate Sodium [Colace -] 100 mg PO BID #20 capsule 01/11/19 


Metoprolol Tartrate [Lopressor -] 12.5 mg PO BID #60 tablet 01/12/19 








This patient is new to me today: No


Emergency Visit: No


Critical Care patient: No





- Discharge Referral


Referred to [facility] No

## 2019-01-14 NOTE — PATH
Surgical Pathology Report



Patient Name:  JANETTE GUNN

Accession #:  [id number]

Med. Rec. #:  [id number]                                                        

   /Age/Gender:  1965 (Age: 53) / F

Account:  [id number]                                                          

             Location: [facility]

Taken:  2019

Received:  2019

Reported:  2019

Physicians:  Javon Canales M.D.

  



Specimen(s) Received

 LEFT KNEE BONE 





Clinical History

Osteoarthritis left knee







Final Diagnosis

BONE, LEFT KNEE, TOTAL KNEE REPLACEMENT:

DEGENERATIVE JOINT DISEASE.





***Electronically Signed***

Rosa Decker M.D.





Gross Description

Received in formalin labeled "left knee bone," is a 12.5 x 11.0 x 2.0 cm

aggregate of multiple portions of bone and soft tissue. The tibial plateau

measures 8.2 x 5.7 x 1.6 cm. There are no areas of eburnation identified. The

articular surfaces are tan-yellow and focally granular. The underlying

trabecular bone is yellow and hard. Representative sections are submitted in one

cassette, following decalcification.

/1/10/2019



[facility]/1/10/2019

## 2019-01-27 ENCOUNTER — HOSPITAL ENCOUNTER (EMERGENCY)
Dept: HOSPITAL 74 - FER | Age: 54
Discharge: HOME | End: 2019-01-27
Payer: COMMERCIAL

## 2019-01-27 VITALS — TEMPERATURE: 98.4 F | HEART RATE: 101 BPM

## 2019-01-27 VITALS — DIASTOLIC BLOOD PRESSURE: 118 MMHG | SYSTOLIC BLOOD PRESSURE: 197 MMHG

## 2019-01-27 VITALS — BODY MASS INDEX: 39.4 KG/M2

## 2019-01-27 DIAGNOSIS — I10: ICD-10-CM

## 2019-01-27 DIAGNOSIS — M25.562: Primary | ICD-10-CM

## 2019-01-27 DIAGNOSIS — K21.9: ICD-10-CM

## 2019-01-27 LAB
ANION GAP SERPL CALC-SCNC: 10 MMOL/L (ref 8–16)
ANISOCYTOSIS BLD QL: (no result)
BASOPHILS # BLD: 0.7 % (ref 0–2)
BUN SERPL-MCNC: 13 MG/DL (ref 7–18)
CALCIUM SERPL-MCNC: 9.4 MG/DL (ref 8.5–10)
CHLORIDE SERPL-SCNC: 104 MMOL/L (ref 98–107)
CO2 SERPL-SCNC: 22 MMOL/L (ref 21–32)
CREAT SERPL-MCNC: 0.8 MG/DL (ref 0.55–1.3)
DACRYOCYTES BLD QL SMEAR: (no result)
DEPRECATED RDW RBC AUTO: 19.8 % (ref 11.6–15.6)
EOSINOPHIL # BLD: 6.4 % (ref 0–4.5)
GLUCOSE SERPL-MCNC: 110 MG/DL (ref 74–106)
HCT VFR BLD CALC: 35.5 % (ref 32.4–45.2)
HGB BLD-MCNC: 10.7 GM/DL (ref 10.7–15.3)
LYMPHOCYTES # BLD: 24.6 % (ref 8–40)
MCH RBC QN AUTO: 20.2 PG (ref 25.7–33.7)
MCHC RBC AUTO-ENTMCNC: 30 G/DL (ref 32–36)
MCV RBC: 67.2 FL (ref 80–96)
MONOCYTES # BLD AUTO: 4.4 % (ref 3.8–10.2)
NEUTROPHILS # BLD: 63.9 % (ref 42.8–82.8)
OVALOCYTES BLD QL SMEAR: (no result)
PLATELET # BLD AUTO: 761 K/MM3 (ref 134–434)
PLATELET BLD QL SMEAR: (no result)
PMV BLD: 8 FL (ref 7.5–11.1)
POTASSIUM SERPLBLD-SCNC: 3.5 MMOL/L (ref 3.5–5.1)
RBC # BLD AUTO: 5.29 M/MM3 (ref 3.6–5.2)
RBC MORPH BLD: YES
SODIUM SERPL-SCNC: 136 MMOL/L (ref 136–145)
WBC # BLD AUTO: 7.2 K/MM3 (ref 4–10.8)

## 2019-01-27 NOTE — PDOC
Attending Attestation





- Resident


Resident Name: CodySabi





- ED Attending Attestation


I have performed the following: I have examined & evaluated the patient, The 

case was reviewed & discussed with the resident, I agree w/resident's findings 

& plan





- HPI


HPI: 





01/27/19 18:26





53YOF with h/o HTN, anemia, GERD, arthritis and who is now POD #18 from total 

left knee replacement with Dr. Javon Canales for osteoarthritis, who p/w left 

knee increased swelling, warmth, nonpurulent drainage from the surgical incision

, mild dehiscence of the surgical incision x 1 week, but no f/c, weakness or 

paresthesias. Has been ambulatory and able to bear weight.


Has been cleaning with hibiclens and topical bacitracin. 


Also has been miscommunicated with antihypertensive regimen, has only been 

taking metoprolol (and not continuing her HCTZ)


during hospitalization she also had HTN noted, completed perioperative abx only 

at that time.





01/27/19 19:02





01/27/19 19:02








- Physicial Exam


PE: 





01/27/19 18:54








General: NAD, nontoxic


Vascular; 2+ DP pulses. 


Lower Extremity: soft compartment. 5/5 plantar and dorsiflexion. SILT. no 

laxity at knee jt. 2+ DP pulses bilaterally.


old vertical scar to right anterior knee


left knee with vertical scar, mild wound dehiscence, no active purulence or 

drainage. +scarring present. +left knee effusion and swelling. some 

discoloration, but no warmth or erythema or tenderness. 


no peripheral edema


MSK: FINNEY x4, FROM in all extrem. no calf tenderness.








- Medical Decision Making





01/27/19 18:54


hpi as documented


VS notable for hypertension, has h/o htn and has not taken hctz since discharge 

(on metoprolol)





DDx. arthritis, knee effusion, knee sprain. Baker's cyst. post op septic 

arthritis/joint effusion/infection. osteomyelitis. clinically considered but 

doubt septic arthritis or gout/infection, as no skin findings and chronicity of 

sx w/o systemic findings. no palp knee effusion on exam to warrant tap. no 

posterior popliteal or deep venous system involvement to suggest DVT or Baker's 

cyst/rupture.





basic labs and lytes_pending


Xray knee with hardware and plating noted, no bony erosions noted with joint 

alignment maintained.


clinically does not appear to be DVT or septic joint. no joint erythema or 

warmth or discharge. some dehiscence noted from excessive scrubbing and motion


ortho cs to Dr Canales who performed surgery after labs and workup.





01/27/19 18:56





01/27/19 18:59





01/27/19 19:00

## 2019-01-27 NOTE — PDOC
*Physical Exam





- Vital Signs


 Last Vital Signs











Temp Pulse Resp BP Pulse Ox


 


 98.4 F   101 H  18   197/118 H  100 


 


 01/27/19 17:36  01/27/19 17:36  01/27/19 17:36  01/27/19 19:25  01/27/19 17:36














ED Treatment Course





- LABORATORY


CBC & Chemistry Diagram: 


 01/27/19 18:15





 01/27/19 18:15





- ADDITIONAL ORDERS


Additional order review: 


 Laboratory  Results











  01/27/19





  18:15


 


Sodium  136


 


Potassium  3.5


 


Chloride  104


 


Carbon Dioxide  22


 


Anion Gap  10


 


BUN  13


 


Creatinine  0.8


 


Creat Clearance w eGFR  > 60


 


Random Glucose  110 H


 


Calcium  9.4


 


C-Reactive Protein  0.9 H








 











  01/27/19





  18:15


 


RBC  5.29 H


 


MCV  67.2 L


 


MCHC  30.0 L


 


RDW  19.8 H


 


MPV  8.0


 


Neutrophils %  63.9


 


Lymphocytes %  24.6


 


Monocytes %  4.4


 


Eosinophils %  6.4 H


 


Basophils %  0.7














- Medications


Given in the ED: 


ED Medications














Discontinued Medications














Generic Name Dose Route Start Last Admin





  Trade Name Freq  PRN Reason Stop Dose Admin


 


Hydrochlorothiazide  25 mg  01/27/19 18:06  01/27/19 18:10





  Hctz -  PO  01/27/19 18:07  25 mg





  ONCE ONE   Administration





     





     





     





     


 


Metoprolol Tartrate  25 mg  01/27/19 19:10  01/27/19 19:25





  Lopressor -  PO  01/27/19 19:11  25 mg





  ONCE ONE   Administration





     





     





     





     














Progress Note





- Progress Note


Progress Note: 





 Care of this patient was transferred to me from Dr. aguila at 1900 hrs. Patient 

is a 53-year-old female who is status post left knee replacement with 

aggressive physical therapy that is resulted in some mild wound adhesives and 

inflammation of the knee.





Patient had a workup which was negative for any acute infectious process 

however and discussions with patient's private orthopedist it was felt that she 

would benefit from a short course of Augmentin so she was started on Augmentin.





Patient does have close follow-up with her orthopedist for next week. Patient 

will keep that appointment.





Patient discharged home





*DC/Admit/Observation/Transfer


Diagnosis at time of Disposition: 


 Essential hypertension





Knee pain, left


Qualifiers:


 Chronicity: unspecified Qualified Code(s): M25.562 - Pain in left knee








- Discharge Dispostion


Disposition: HOME


Condition at time of disposition: Stable


Decision to Admit order: No





- Prescriptions


Prescriptions: 


Amoxicillin/Potassium Clav [Augmentin 875-125 Tablet] 1 each PO BID #10 tablet





- Referrals


Referrals: 


Javon Canales MD [Primary Care Provider] - 





- Patient Instructions


Additional Instructions: 


Decrease the intensity of your physical therapy as you are putting a lot of 

pressure on the wound and the knee so go a little less aggressive to give it 

time to heal.





Take Augmentin 1 tablet twice a day for 5 days I sent a prescription to your 

pharmacy.





Keep your appointment with your orthopedist on Thursday.





Your blood pressure was also elevated while you're here in the ED so it is 

important that you take both your metoprolol and her hydrochlorothiazide as 

prescribed.





Return to the emergency department immediately with ANY new, persistent or 

worsening symptoms.





Continue any medications as previously prescribed by your physician.





You should follow up with your primary doctor as soon as possible regarding 

today's emergency department visit.


.


Please make sure your doctor reviews the results of your emergency evaluation.





Thank you for coming to the   Emergency Department today for your care. It was 

a pleasure to see you today. Please note that your evaluation is INCOMPLETE 

until you  follow-up with your doctor. 





- Post Discharge Activity

## 2019-01-27 NOTE — PDOC
History of Present Illness





- General


Chief Complaint: Revisit,Wound Recheck


Stated Complaint: LEFT KNEE WOUND INFECTION


Time Seen by Provider: 01/27/19 17:36


History Source: Patient


Exam Limitations: No Limitations





- History of Present Illness


Initial Comments: 





01/27/19 17:42


53YOF with h/o HTN, anemia, GERD, and who is now POD #18 from left knee 

replacement with Dr. Javon Canales for osteoarthritis, who p/w left knee 

increased swelling, warmth, drainage from the surgical incision, mild 

dehiscence of the surgical incision, and mild tenderness relative to a week 

ago. She denies any f/c/n/v/d/c, streaking redness, malodorous drainage, 

difficulty walking or ranging the joint, or other symptoms. She has been 

scrubbing the skin around the wound with hibiclens daily, and putting 

bacitracin on it. Her BP is elevated today and she explains that while she was 

in the hospital for the knee replacement, she was switched from her prior HCTZ 

25 mg daily to metoprolol 12.5 mg bid, and she has been taking only the 

metoprolol since that time. However, review of her hospital records reveals 

that the intent was to add on the metoprolol, not to switch medications. She 

has not followed up with her PCP since that time, and her first f/u appointment 

with Dr. Canales is scheduled for 4 days from now.





Past History





- Past Medical History


Allergies/Adverse Reactions: 


 Allergies











Allergy/AdvReac Type Severity Reaction Status Date / Time


 


No Known Allergies Allergy   Verified 01/27/19 17:36











Home Medications: 


Ambulatory Orders





Oxycodone HCl 5 mg PO Q6H #28 tablet MDD 4 11/04/18 


Aspirin Coated [Ecotrin -] 81 mg PO BID #90 tablet.ec 01/11/19 


Docusate Sodium [Colace -] 100 mg PO BID #20 capsule 01/11/19 


Metoprolol Tartrate [Lopressor -] 12.5 mg PO BID #60 tablet 01/12/19 








Anemia: Yes


Asthma: No


Cancer: No


Cardiac Disorders: No


CVA: No


COPD: No


CHF: No


Dementia: No


Diabetes: No


GI Disorders: No


 Disorders: No


HTN: Yes


Hypercholesterolemia: No


Liver Disease: No


Seizures: No


Thyroid Disease: No





- Surgical History


Abdominal Surgery: No


Appendectomy: No


Cardiac Surgery: No


Cholecystectomy: No


Lung Surgery: No


Neurologic Surgery: No


Orthopedic Surgery: Yes (Bilateral Knee Arthroscopy, RIGHT TOTAL KNEE 10/2018)





- Suicide/Smoking/Psychosocial Hx


Smoking History: Never smoked


Have you smoked in the past 12 months: No


Hx Alcohol Use: No


Drug/Substance Use Hx: No


Substance Use Type: None


Hx Substance Use Treatment: No





**Review of Systems





- Review of Systems


Able to Perform ROS?: Yes


Comments:: 





01/27/19 18:16


GEN: no fever, chills, malaise, generalized weakness, or weight change


HEENT: no ear pain, sore throat, vision change, or eye pain


CV: no chest pain, palpitations, lightheadedness, syncope, or edema


RESP: no cough, wheezing, or SOB


GI: no abdominal pain, nausea, vomiting, diarrhea, constipation, or white/black/

bloody stool


: no dysuria, hematuria, incontinence, retention, bleeding, or discharge 


MSK: left knee swelling/mild pain, no neck/back pain, or muscle weakness/pain


NEURO: no headache, seizure, vertigo, numbness, tingling, or focal weakness


PSYCH: no substance use, no behavior change


SKIN: surgical incision slightly opening and drainage, no jaundice, no rash


ROS otherwise negative except as noted in HPI





*Physical Exam





- Vital Signs





01/27/19 18:16


 Initial Vital Signs











Temp Pulse Resp BP Pulse Ox


 


 98.4 F   101 H  18   200/140 H  100 


 


 01/27/19 17:36  01/27/19 17:36  01/27/19 17:36  01/27/19 17:36  01/27/19 17:36














ED Treatment Course





- LABORATORY


CBC & Chemistry Diagram: 


 01/27/19 18:15





 01/27/19 18:15





Medical Decision Making





- Medical Decision Making





01/27/19 18:11


Pt p/w knee pain and swelling 18 days post-op from knee replacement.





 Initial Vital Signs











Temp Pulse Resp BP Pulse Ox


 


 98.4 F   101 H  18   200/140 H  100 


 


 01/27/19 17:36  01/27/19 17:36  01/27/19 17:36  01/27/19 17:36  01/27/19 17:36








Exam: As noted in Physical Exam section.


DDX IBNLT: effusion (e.g. 2/2 osteoarthritis, overuse), gout, pseudogout, 

prepatellar bursitis, septic arthritis, hemarthrosis, edema (e.g. from CHF 

exacerbation or PVD), gonorrhea, necrotizing soft tissue infection, DVT, 

superficial venous thrombosis, popliteal cyst (wwo rupture), etc.


W/U ordered: CBCD BMP ESR CRP XR knee to r/o gas/effusion.


TX ordered: HCTZ 25 mg (patient's home dose she has not been taking)





 Laboratory Tests











  01/27/19





  18:15


 


Sodium  136


 


Potassium  3.5


 


Chloride  104


 


Carbon Dioxide  22


 


Anion Gap  10


 


BUN  13


 


Creatinine  0.8


 


Creat Clearance w eGFR  > 60


 


Random Glucose  110 H


 


Calcium  9.4











01/27/19 19:00


Patient's care is endorsed to Dr. aFrfan at the end of my shift pending 

remaining lab work.





*DC/Admit/Observation/Transfer


Diagnosis at time of Disposition: 


Knee pain, left


Qualifiers:


 Chronicity: unspecified Qualified Code(s): M25.562 - Pain in left knee








- Discharge Dispostion


Condition at time of disposition: Stable





- Referrals


Referrals: 


Javon Canales MD [Primary Care Provider] - 





- Patient Instructions





- Post Discharge Activity